# Patient Record
Sex: MALE | Race: WHITE | NOT HISPANIC OR LATINO | Employment: OTHER | URBAN - METROPOLITAN AREA
[De-identification: names, ages, dates, MRNs, and addresses within clinical notes are randomized per-mention and may not be internally consistent; named-entity substitution may affect disease eponyms.]

---

## 2018-04-12 RX ORDER — SILDENAFIL 50 MG/1
TABLET, FILM COATED ORAL
COMMUNITY
End: 2018-04-12 | Stop reason: SDUPTHER

## 2018-04-12 RX ORDER — SILDENAFIL 50 MG/1
50 TABLET, FILM COATED ORAL DAILY PRN
Qty: 20 TABLET | Refills: 0 | Status: SHIPPED | OUTPATIENT
Start: 2018-04-12 | End: 2019-04-26

## 2018-04-12 NOTE — TELEPHONE ENCOUNTER
----- Message from Muriel Mendoza sent at 4/12/2018 11:36 AM CDT -----  Contact: Patient  Patient is requesting a refill on Viagra.  Call Back#[phone]  Thanks     Pharm[facility]:  [facility]

## 2018-04-13 RX ORDER — TRAZODONE HYDROCHLORIDE 100 MG/1
TABLET ORAL
Qty: 180 TABLET | Refills: 3 | Status: SHIPPED | OUTPATIENT
Start: 2018-04-13 | End: 2018-09-13 | Stop reason: SDUPTHER

## 2018-09-10 RX ORDER — TRAZODONE HYDROCHLORIDE 300 MG/1
TABLET ORAL
Qty: 30 TABLET | Refills: 0 | OUTPATIENT
Start: 2018-09-10

## 2018-09-13 RX ORDER — TRAZODONE HYDROCHLORIDE 100 MG/1
200 TABLET ORAL DAILY
Qty: 180 TABLET | Refills: 0 | Status: SHIPPED | OUTPATIENT
Start: 2018-09-13 | End: 2018-12-19 | Stop reason: SDUPTHER

## 2018-09-13 NOTE — TELEPHONE ENCOUNTER
Notified pt of refill.Refill request(s).  Please advise.  Thank you.       ----- Message from Jevon Montes sent at 9/13/2018  3:38 PM CDT -----  Type:  RX Refill Request    Who Called:  Patient  Refill or New Rx:  Refill  RX Name and Strength:  Trazodone  Preferred Pharmacy with phone number:   [facility] [address] [place] [address] AT [facility] [address] [address]  Phone: [phone] Fax: [phone]  Local or Mail Order:  Local  Ordering Provider:  Same  Best Call Back Number:  [phone]

## 2018-10-10 ENCOUNTER — OFFICE VISIT (OUTPATIENT)
Dept: FAMILY MEDICINE | Facility: CLINIC | Age: 68
End: 2018-10-10
Payer: MEDICARE

## 2018-10-10 VITALS
OXYGEN SATURATION: 96 % | DIASTOLIC BLOOD PRESSURE: 75 MMHG | TEMPERATURE: 98 F | BODY MASS INDEX: 31.83 KG/M2 | HEART RATE: 69 BPM | SYSTOLIC BLOOD PRESSURE: 110 MMHG | WEIGHT: 210 LBS | HEIGHT: 68 IN

## 2018-10-10 DIAGNOSIS — T14.8XXA HEMATOMA AND CONTUSION: ICD-10-CM

## 2018-10-10 DIAGNOSIS — B00.9 HERPES SIMPLEX: Primary | ICD-10-CM

## 2018-10-10 PROCEDURE — 99213 OFFICE O/P EST LOW 20 MIN: CPT | Mod: S$GLB,,, | Performed by: NURSE PRACTITIONER

## 2018-10-10 PROCEDURE — 1100F PTFALLS ASSESS-DOCD GE2>/YR: CPT | Mod: CPTII,S$GLB,, | Performed by: NURSE PRACTITIONER

## 2018-10-10 RX ORDER — SULFASALAZINE 500 MG/1
TABLET ORAL
COMMUNITY
End: 2019-06-18 | Stop reason: SDUPTHER

## 2018-10-10 RX ORDER — VALACYCLOVIR HYDROCHLORIDE 500 MG/1
500 TABLET, FILM COATED ORAL 2 TIMES DAILY
Qty: 60 TABLET | Refills: 0 | Status: SHIPPED | OUTPATIENT
Start: 2018-10-10 | End: 2019-04-26

## 2018-10-10 RX ORDER — DOXYCYCLINE HYCLATE 100 MG
TABLET ORAL
Refills: 0 | COMMUNITY
Start: 2018-09-02 | End: 2019-04-26

## 2018-10-10 RX ORDER — BUPROPION HYDROCHLORIDE 300 MG/1
TABLET ORAL
COMMUNITY
End: 2020-01-13 | Stop reason: CLARIF

## 2018-10-10 RX ORDER — BUSPIRONE HYDROCHLORIDE 10 MG/1
TABLET ORAL
Refills: 1 | COMMUNITY
Start: 2018-08-10 | End: 2018-11-19 | Stop reason: SDUPTHER

## 2018-10-10 RX ORDER — OLANZAPINE 5 MG/1
TABLET ORAL
COMMUNITY
End: 2019-04-26 | Stop reason: ALTCHOICE

## 2018-10-10 RX ORDER — PANTOPRAZOLE SODIUM 40 MG/1
TABLET, DELAYED RELEASE ORAL
COMMUNITY
End: 2019-02-06 | Stop reason: SDUPTHER

## 2018-10-10 RX ORDER — DOXYCYCLINE HYCLATE 100 MG
TABLET ORAL
Qty: 30 TABLET | Refills: 0 | OUTPATIENT
Start: 2018-10-10

## 2018-10-10 RX ORDER — AMLODIPINE BESYLATE 5 MG/1
TABLET ORAL
COMMUNITY
End: 2018-12-19 | Stop reason: SDUPTHER

## 2018-10-10 RX ORDER — CITALOPRAM 20 MG/1
TABLET, FILM COATED ORAL
COMMUNITY
Start: 2018-03-26 | End: 2020-01-13 | Stop reason: CLARIF

## 2018-10-10 RX ORDER — LISINOPRIL 20 MG/1
TABLET ORAL
COMMUNITY
End: 2023-07-12

## 2018-10-10 RX ORDER — TAMSULOSIN HYDROCHLORIDE 0.4 MG/1
CAPSULE ORAL
COMMUNITY
End: 2019-05-09 | Stop reason: SDUPTHER

## 2018-10-10 RX ORDER — CARVEDILOL PHOSPHATE 20 MG/1
20 CAPSULE, EXTENDED RELEASE ORAL DAILY
COMMUNITY
End: 2020-01-13 | Stop reason: CLARIF

## 2018-10-10 RX ORDER — ASPIRIN 81 MG/1
81 TABLET ORAL DAILY
COMMUNITY

## 2018-10-10 NOTE — PROGRESS NOTES
Chief Complaint  Chief Complaint   Patient presents with    Fall     pt states he fell and bruised his right arm       HPI  Betzy Ortega is a 68 y.o. male with medical diagnoses as listed within the medical history and problem list that presents for fall approximately 12 days prior to visit today. He was stepping off into a boat and slipped. He notes bruising to RUE above elbow. He had abrasions which are healing well. He is concerned with a localized palpable area within the contusion. He has pain to the ipsilateral elbow and wrist. He is also requesting refill of Valtrex for recurrent herpes simplex virus. He usually has some on hand for outbreak. He only needs a refill, no outbreak at this time..     PAST MEDICAL HISTORY:  History reviewed. No pertinent past medical history.    PAST SURGICAL HISTORY:  History reviewed. No pertinent surgical history.    SOCIAL HISTORY:  Social History     Socioeconomic History    Marital status:      Spouse name: Not on file    Number of children: Not on file    Years of education: Not on file    Highest education level: Not on file   Social Needs    Financial resource strain: Not on file    Food insecurity - worry: Not on file    Food insecurity - inability: Not on file    Transportation needs - medical: Not on file    Transportation needs - non-medical: Not on file   Occupational History    Not on file   Tobacco Use    Smoking status: Never Smoker   Substance and Sexual Activity    Alcohol use: No     Frequency: Never    Drug use: No    Sexual activity: Not on file   Other Topics Concern    Not on file   Social History Narrative    Not on file       FAMILY HISTORY:  History reviewed. No pertinent family history.    ALLERGIES AND MEDICATIONS: updated and reviewed.  Review of patient's allergies indicates:   Allergen Reactions    Abilify [aripiprazole]     Dapsone     Hydrochlorothiazide     Losartan      Current Outpatient Medications   Medication  Sig Dispense Refill    amLODIPine (NORVASC) 5 MG tablet amlodipine 5 mg tablet   Take 1 tablet every day by oral route for 90 days.      aspirin (ECOTRIN) 81 MG EC tablet Take 81 mg by mouth once daily.      buPROPion (WELLBUTRIN XL) 300 MG 24 hr tablet bupropion hcl xl 300 mg tb24      busPIRone (BUSPAR) 10 MG tablet TK 1 T PO TID PRN  1    CARVEDILOL PHOSPHATE 20 MG ORAL CM24 (COREG CR) 20 mg 24 hr capsule Take 20 mg by mouth once daily.      citalopram (CELEXA) 20 MG tablet citalopram 20 mg tablet      doxycycline (VIBRA-TABS) 100 MG tablet TK 1 T PO QD  0    lisinopril (PRINIVIL,ZESTRIL) 20 MG tablet lisinopril 20 mg tablet   TAKE 1 TABLET EVERY DAY      OLANZapine (ZYPREXA) 5 MG tablet olanzapine 5 mg tablet   TAKE 1 TABLET EVERY DAY      pantoprazole (PROTONIX) 40 MG tablet pantoprazole 40 mg tablet,delayed release   TAKE 1 TABLET EVERY DAY      sildenafil (VIAGRA) 50 MG tablet Take 1 tablet (50 mg total) by mouth daily as needed for Erectile Dysfunction. 20 tablet 0    sulfaSALAzine (AZULFIDINE) 500 mg Tab sulfasalazine 500 mg tablet   TAKE 1 TABLET BY MOUTH 3 TIMES A DAY AS NEEDED      tamsulosin (FLOMAX) 0.4 mg Cap tamsulosin 0.4 mg capsule   TAKE 1 CAPSULE BY MOUTH EVERYDAY      traZODone (DESYREL) 100 MG tablet Take 2 tablets (200 mg total) by mouth once daily. 180 tablet 0    valACYclovir (VALTREX) 500 MG tablet Take 1 tablet (500 mg total) by mouth 2 (two) times daily. Take for 3 day with outbreak. 60 tablet 0     No current facility-administered medications for this visit.          ROS  Review of Systems   Constitutional: Negative for fatigue.   Eyes: Negative for visual disturbance.   Respiratory: Negative for shortness of breath.    Cardiovascular: Negative for chest pain and palpitations.   Gastrointestinal: Negative for abdominal pain.   Genitourinary: Negative for difficulty urinating.   Musculoskeletal: Positive for arthralgias (RUE, see HPI.).   Skin: Negative for pallor and rash.  "  Neurological: Negative for dizziness, light-headedness and headaches.   Hematological: Bruises/bleeds easily.           PHYSICAL EXAM  Vitals:    10/10/18 1545   BP: 110/75   BP Location: Left arm   Patient Position: Sitting   BP Method: Medium (Automatic)   Pulse: 69   Temp: 97.8 °F (36.6 °C)   TempSrc: Tympanic   SpO2: 96%   Weight: 95.3 kg (210 lb)   Height: 5' 8" (1.727 m)    Body mass index is 31.93 kg/m².  Weight: 95.3 kg (210 lb)   Height: 5' 8" (172.7 cm)       Physical Exam   Constitutional: He is oriented to person, place, and time. He appears well-developed and well-nourished.   HENT:   Head: Normocephalic and atraumatic.   Eyes: EOM are normal. Pupils are equal, round, and reactive to light.   Neck: Normal range of motion. Neck supple.   Cardiovascular: Normal rate and regular rhythm.   Pulmonary/Chest: Effort normal and breath sounds normal.   Abdominal: Soft. Bowel sounds are normal.   Musculoskeletal: Normal range of motion.   Neurological: He is alert and oriented to person, place, and time.   Skin: Bruising and ecchymosis noted.        Psychiatric: He has a normal mood and affect. His behavior is normal. Judgment and thought content normal.   Vitals reviewed.        Health Maintenance       Date Due Completion Date    Hepatitis C Screening 1950 ---    Lipid Panel 1950 ---    TETANUS VACCINE 07/24/1967 ---    Colonoscopy 07/24/1999 ---    Zoster Vaccine 07/24/2009 ---    Pneumococcal (65+) (1 of 2 - PCV13) 07/24/2014 ---    Influenza Vaccine 08/01/2018 10/16/2014               Assessment & Plan    Betzy was seen today for fall.    Diagnoses and all orders for this visit:    Herpes simplex  -     valACYclovir (VALTREX) 500 MG tablet; Take 1 tablet (500 mg total) by mouth 2 (two) times daily. Take for 3 day with outbreak.    Hematoma and contusion  Made appointment with Dr. Truong for surgical consult. I recommended ultrasound imaging of the area. Patient would like surgical opinion " first. For now, Tylenol PRN for pain and warm compresses to the area. Patient voiced understanding.       Follow-up: No Follow-up on file.

## 2018-10-11 ENCOUNTER — OFFICE VISIT (OUTPATIENT)
Dept: SURGERY | Facility: CLINIC | Age: 68
End: 2018-10-11
Payer: MEDICARE

## 2018-10-11 VITALS
DIASTOLIC BLOOD PRESSURE: 82 MMHG | RESPIRATION RATE: 18 BRPM | BODY MASS INDEX: 31.52 KG/M2 | SYSTOLIC BLOOD PRESSURE: 119 MMHG | TEMPERATURE: 96 F | WEIGHT: 208 LBS | HEIGHT: 68 IN | HEART RATE: 65 BPM | OXYGEN SATURATION: 96 %

## 2018-10-11 DIAGNOSIS — S40.021A TRAUMATIC HEMATOMA OF RIGHT UPPER ARM, INITIAL ENCOUNTER: Primary | ICD-10-CM

## 2018-10-11 DIAGNOSIS — S54.01XA CONTUSION OF RIGHT ULNAR NERVE, INITIAL ENCOUNTER: ICD-10-CM

## 2018-10-11 PROCEDURE — 1100F PTFALLS ASSESS-DOCD GE2>/YR: CPT | Mod: CPTII,S$GLB,, | Performed by: SURGERY

## 2018-10-11 PROCEDURE — 99203 OFFICE O/P NEW LOW 30 MIN: CPT | Mod: S$GLB,,, | Performed by: SURGERY

## 2018-10-11 RX ORDER — GABAPENTIN 300 MG/1
300 CAPSULE ORAL 2 TIMES DAILY
Qty: 60 CAPSULE | Refills: 0 | Status: SHIPPED | OUTPATIENT
Start: 2018-10-11 | End: 2019-04-26

## 2018-10-11 RX ORDER — ONDANSETRON 4 MG/1
4 TABLET, FILM COATED ORAL EVERY 6 HOURS PRN
Qty: 30 TABLET | Refills: 0 | Status: SHIPPED | OUTPATIENT
Start: 2018-10-11 | End: 2020-01-13 | Stop reason: CLARIF

## 2018-10-11 RX ORDER — OXYCODONE AND ACETAMINOPHEN 10; 325 MG/1; MG/1
1 TABLET ORAL EVERY 6 HOURS PRN
Qty: 40 TABLET | Refills: 0 | Status: SHIPPED | OUTPATIENT
Start: 2018-10-11 | End: 2020-01-13 | Stop reason: CLARIF

## 2018-10-11 NOTE — PROGRESS NOTES
Subjective:       Patient ID: Betzy Ortega is a 68 y.o. male.    Chief Complaint: Consult (Referral-Evi, SOL- Bunny SUMMERS )      HPI:  Mr. Ortega presents today as a consult from Evi HAWKINS.  He fell getting onto a boat about 2 weeks ago.  Pain began in his right elbow medial side and radiates to his right ulnar side of the wrist as well as the right ring and small fingers.  He is also noted numbness in this area. He fell striking the right upper arm on the side of the boat.  No other trauma.  He noted a significant bruise on the extensor surface of his right arm just proximal to the elbow the day following the accident.  He is noted firm tender masses; 1 masses immediately beneath the bruise and the other is in the posterior upper arm at the level of the humerus proximal and middle third junctions.        Allergies & Meds:  Review of patient's allergies indicates:   Allergen Reactions    Abilify [aripiprazole]     Dapsone     Hydrochlorothiazide     Losartan        Current Outpatient Medications   Medication Sig Dispense Refill    amLODIPine (NORVASC) 5 MG tablet amlodipine 5 mg tablet   Take 1 tablet every day by oral route for 90 days.      aspirin (ECOTRIN) 81 MG EC tablet Take 81 mg by mouth once daily.      buPROPion (WELLBUTRIN XL) 300 MG 24 hr tablet bupropion hcl xl 300 mg tb24      busPIRone (BUSPAR) 10 MG tablet TK 1 T PO TID PRN  1    CARVEDILOL PHOSPHATE 20 MG ORAL CM24 (COREG CR) 20 mg 24 hr capsule Take 20 mg by mouth once daily.      citalopram (CELEXA) 20 MG tablet citalopram 20 mg tablet      doxycycline (VIBRA-TABS) 100 MG tablet TK 1 T PO QD  0    lisinopril (PRINIVIL,ZESTRIL) 20 MG tablet lisinopril 20 mg tablet   TAKE 1 TABLET EVERY DAY      OLANZapine (ZYPREXA) 5 MG tablet olanzapine 5 mg tablet   TAKE 1 TABLET EVERY DAY      pantoprazole (PROTONIX) 40 MG tablet pantoprazole 40 mg tablet,delayed release   TAKE 1 TABLET EVERY DAY      sildenafil (VIAGRA) 50 MG  tablet Take 1 tablet (50 mg total) by mouth daily as needed for Erectile Dysfunction. 20 tablet 0    sulfaSALAzine (AZULFIDINE) 500 mg Tab sulfasalazine 500 mg tablet   TAKE 1 TABLET BY MOUTH 3 TIMES A DAY AS NEEDED      tamsulosin (FLOMAX) 0.4 mg Cap tamsulosin 0.4 mg capsule   TAKE 1 CAPSULE BY MOUTH EVERYDAY      traZODone (DESYREL) 100 MG tablet Take 2 tablets (200 mg total) by mouth once daily. 180 tablet 0    valACYclovir (VALTREX) 500 MG tablet Take 1 tablet (500 mg total) by mouth 2 (two) times daily. Take for 3 day with outbreak. 60 tablet 0    gabapentin (NEURONTIN) 300 MG capsule Take 1 capsule (300 mg total) by mouth 2 (two) times daily. 60 capsule 0    ondansetron (ZOFRAN) 4 MG tablet Take 1 tablet (4 mg total) by mouth every 6 (six) hours as needed for Nausea. 30 tablet 0    oxyCODONE-acetaminophen (PERCOCET)  mg per tablet Take 1 tablet by mouth every 6 (six) hours as needed for Pain. 40 tablet 0     No current facility-administered medications for this visit.        PMFSHx:  Past Medical History:   Diagnosis Date    Anxiety     Cataract     Depression     Disorder of kidney and ureter     Hypertension     Lupus     Stroke 04/2017       Past Surgical History:   Procedure Laterality Date    carpel tunnel hand Bilateral     cataract surgery Left     cervical fusion cadivor bone      KNEE SURGERY Left     OSTEOMYLITIS       Family History   Problem Relation Age of Onset    Cancer Mother     Prostate cancer Father     Pancreatic cancer Brother        Social History     Tobacco Use    Smoking status: Never Smoker   Substance Use Topics    Alcohol use: No     Frequency: Never    Drug use: No       Review of Systems   Constitutional: Negative for appetite change, chills, fatigue, fever and unexpected weight change.   HENT: Negative for congestion, dental problem, ear pain, mouth sores, postnasal drip, rhinorrhea, sore throat, tinnitus, trouble swallowing and voice change.     Eyes: Negative for photophobia, pain, discharge and visual disturbance.   Respiratory: Negative for cough, chest tightness, shortness of breath and wheezing.    Cardiovascular: Negative for chest pain, palpitations and leg swelling.   Gastrointestinal: Negative for abdominal pain, blood in stool, constipation, diarrhea, nausea and vomiting.   Endocrine: Negative for cold intolerance, heat intolerance, polydipsia, polyphagia and polyuria.   Genitourinary: Negative for difficulty urinating, dysuria, flank pain, frequency, hematuria and urgency.   Musculoskeletal: Negative for arthralgias, joint swelling and neck pain.   Skin: Negative for color change and rash.   Allergic/Immunologic: Negative for immunocompromised state.   Neurological: Negative for dizziness, tremors, seizures, syncope, speech difficulty, weakness, numbness and headaches.   Hematological: Negative for adenopathy. Does not bruise/bleed easily.   Psychiatric/Behavioral: Negative for agitation, confusion, hallucinations, self-injury and suicidal ideas. The patient is not nervous/anxious.        Objective:      Physical Exam   Constitutional: He appears well-developed and well-nourished.  Non-toxic appearance. He does not appear ill. No distress.   Cardiovascular: Normal rate, regular rhythm and normal heart sounds. PMI is not displaced. Exam reveals no gallop.   No murmur heard.  Pulmonary/Chest: Effort normal and breath sounds normal. No accessory muscle usage. No tachypnea. No respiratory distress.   Abdominal: Soft. Normal appearance and bowel sounds are normal. There is no tenderness. No hernia.   Musculoskeletal:        Right upper arm: He exhibits tenderness and swelling.        Right forearm: He exhibits tenderness and swelling.   Hematoma just proximal to the medial condyle of the right elbow measures 4 x 4 cm with overlying ecchymosis.  Hematoma measuring 5 x 5 cm at the junction of the proximal and middle thirds of the right humerus  extensor surface. Full range of motion right hand, wrist, elbow, shoulder.   Skin: Skin is warm, dry and intact. No rash noted.           Assessment:       1. Traumatic hematoma of right upper arm, initial encounter    2. Contusion of right ulnar nerve, initial encounter        Plan:   Traumatic hematoma of right upper arm, initial encounter    Contusion of right ulnar nerve, initial encounter    Other orders  -     oxyCODONE-acetaminophen (PERCOCET)  mg per tablet; Take 1 tablet by mouth every 6 (six) hours as needed for Pain.  Dispense: 40 tablet; Refill: 0  -     ondansetron (ZOFRAN) 4 MG tablet; Take 1 tablet (4 mg total) by mouth every 6 (six) hours as needed for Nausea.  Dispense: 30 tablet; Refill: 0  -     gabapentin (NEURONTIN) 300 MG capsule; Take 1 capsule (300 mg total) by mouth 2 (two) times daily.  Dispense: 60 capsule; Refill: 0        Follow-up in about 2 weeks (around 10/25/2018).

## 2018-11-13 RX ORDER — DOXYCYCLINE HYCLATE 100 MG
TABLET ORAL
Qty: 30 TABLET | Refills: 0 | OUTPATIENT
Start: 2018-11-13

## 2018-11-13 RX ORDER — BUSPIRONE HYDROCHLORIDE 10 MG/1
TABLET ORAL
Qty: 90 TABLET | Refills: 0 | OUTPATIENT
Start: 2018-11-13

## 2018-11-19 NOTE — TELEPHONE ENCOUNTER
Pt notified      Pt request refill.  Please advise.  Thank you,  Cynthia        ----- Message from Raimundo Schmitt sent at 11/19/2018  2:42 PM CST -----  Contact: Patient  Type:  RX Refill Request    Who Called:  Patient  Refill or New Rx:  Refill  RX Name and Strength:  busPIRone (BUSPAR) 10 MG tablet  How is the patient currently taking it? (ex. 1XDay):  Doctor Recommended  Is this a 30 day or 90 day RX:  30  Preferred Pharmacy with phone number:    [facility] [id number] [place] [address] - [address] [place] [address] AT [facility] [address]  [address] [facility] [address]  [facility] [address]  Phone: [phone] Fax: [phone]  Local or Mail Order:  Local  Ordering Provider:  Evi Regalado Call Back Number:  [phone]  Additional Information:  NA

## 2018-11-20 ENCOUNTER — OFFICE VISIT (OUTPATIENT)
Dept: FAMILY MEDICINE | Facility: CLINIC | Age: 68
End: 2018-11-20
Payer: MEDICARE

## 2018-11-20 VITALS
HEIGHT: 68 IN | WEIGHT: 210.19 LBS | HEART RATE: 68 BPM | BODY MASS INDEX: 31.86 KG/M2 | SYSTOLIC BLOOD PRESSURE: 123 MMHG | DIASTOLIC BLOOD PRESSURE: 77 MMHG | OXYGEN SATURATION: 98 % | TEMPERATURE: 99 F

## 2018-11-20 DIAGNOSIS — J00 HEAD COLD: Primary | ICD-10-CM

## 2018-11-20 PROCEDURE — 99213 OFFICE O/P EST LOW 20 MIN: CPT | Mod: S$GLB,,, | Performed by: NURSE PRACTITIONER

## 2018-11-20 PROCEDURE — 1100F PTFALLS ASSESS-DOCD GE2>/YR: CPT | Mod: CPTII,S$GLB,, | Performed by: NURSE PRACTITIONER

## 2018-11-20 RX ORDER — PSEUDOEPHEDRINE HCL 30 MG
30 TABLET ORAL EVERY 4 HOURS PRN
Qty: 30 TABLET | Refills: 0 | Status: SHIPPED | OUTPATIENT
Start: 2018-11-20 | End: 2018-11-30

## 2018-11-20 RX ORDER — BUSPIRONE HYDROCHLORIDE 10 MG/1
TABLET ORAL
Qty: 90 TABLET | Refills: 0 | Status: SHIPPED | OUTPATIENT
Start: 2018-11-20 | End: 2019-02-06

## 2018-11-20 NOTE — PROGRESS NOTES
Subjective:       Patient ID: Betzy Ortega is a 68 y.o. male.    Chief Complaint: Sinusitis (pt c/o itchy, watery eyes, sneezing no fever)    69 y/o WM presents with c/o 3 week h/o runny nose and post nasal drip. He denies fever, chills, HA or ear pressure/pain. He has been taking OCT Sudafed. He denies productive yellow/green phlegm or sputum. His BP is well controlled despite taking OTC medications.       Sinus Problem   This is a new problem. The current episode started 1 to 4 weeks ago. The problem is unchanged. There has been no fever. His pain is at a severity of 0/10. He is experiencing no pain. Associated symptoms include sneezing. Pertinent negatives include no chills, coughing, diaphoresis, ear pain, headaches, shortness of breath, sinus pressure or sore throat. Treatments tried: OTC Sudafed. The treatment provided mild relief.     Review of Systems   Constitutional: Negative for chills, diaphoresis, fever and unexpected weight change.   HENT: Positive for postnasal drip, rhinorrhea and sneezing. Negative for dental problem, ear pain, sinus pressure, sore throat and trouble swallowing.    Eyes: Negative for visual disturbance.   Respiratory: Negative for cough, shortness of breath and wheezing.    Cardiovascular: Negative for chest pain and palpitations.   Gastrointestinal: Negative for abdominal pain, constipation, diarrhea, nausea and vomiting.   Endocrine: Negative for polydipsia and polyuria.   Genitourinary: Negative for dysuria.   Musculoskeletal: Negative for joint swelling.   Skin: Negative for rash.   Neurological: Negative for syncope and headaches.   Psychiatric/Behavioral: Negative for agitation and confusion.       Objective:      Physical Exam   Constitutional: He is oriented to person, place, and time. He appears well-developed and well-nourished.   HENT:   Head: Normocephalic.   Eyes: Pupils are equal, round, and reactive to light.   Neck: Normal range of motion. Neck supple.    Cardiovascular: Normal rate, regular rhythm and normal heart sounds.   Pulmonary/Chest: Effort normal and breath sounds normal.   Abdominal: Soft. Bowel sounds are normal.   Musculoskeletal: Normal range of motion.   Neurological: He is alert and oriented to person, place, and time.   Skin: Skin is warm and dry. Capillary refill takes less than 2 seconds.   Psychiatric: He has a normal mood and affect. Judgment and thought content normal.   Vitals reviewed.      Assessment:       1. Head cold        Plan:       1- if symptoms worsen with productive yellow/green phlegm or sputum pt to call the clinic for abx  2- take prescription sudafed as prescribed

## 2018-11-20 NOTE — PATIENT INSTRUCTIONS
Understanding the Cold Virus  Colds are the most common illness that people get. Most adults get 2 or 3 colds per year, and most children get 5 to 7 colds per year. Colds may be caused by over 200 types of viruses. The most common of these are rhinoviruses (rhino refers to the nose).  What causes a cold virus?  All colds start with infection by a virus. You can be infected by more than one cold virus at a time. Infection with cold viruses happens when:  · You breathe in a virus from the air. This can happen when someone with a cold sneezes or coughs near you.  · You touch your eyes, nose, or mouth when your hand has a cold virus on it. This can happen if you touch an object that has the cold virus on it.  What are the symptoms of a cold virus?  Almost all colds involve a stuffy nose. Other common symptoms include:  · Runny nose  · Sneezing  · Sore throat  · Headache  · Cough  How is a cold treated?  Colds usually last 5 to 10 days. Treatment focuses on relieving symptoms. Treatments may include:  · Decongestant medicines. Several types of decongestants are available without prescription. These may help reduce stuffy or runny nose symptoms.  · Prescription or over-the-counter nasal sprays. These may help reduce nasal symptoms, including stuffiness.  · Prescription or over-the-counter pain medicines. These can help with headaches and sore throat.  · Self-care. This includes extra rest, using humidifiers, and drinking more fluids. These help you feel better while you are getting over a cold.  Antibiotics are not helpful for a cold. They do not make a cold shorter or relieve symptoms. Taking antibiotics when you dont need them can make them work less well when you need them for another illness.  Follow all directions for using medicines, especially when giving them to children. Contact your healthcare provider if you have any questions about using cold medicines safely.  Can a cold be prevented?  You can help  reduce the spread of cold viruses. This can help both you and others avoid getting colds. Follow these tips:  · Wash your hands well anytime you may have come into contact with cold viruses. Wash your hands for at least 20 seconds. When you cant wash with soap and water, use an alcohol-based hand .  · Dont touch your nose, eyes, or mouth, especially after touching something that may have a cold virus on it.  · Cover your mouth and nose when you cough or sneeze. Throw away tissues after using them.  · Disinfect things you touch often, such as phones and keyboards.    · Stay home when you have a cold.  What are the possible complications of a cold virus?  Colds usually go away by themselves. But its not unusual to get another type of infection while you have a cold. These can include:  · Sinus infection  · Lung infection, such as bronchitis or pneumonia  · Ear infection  If you have asthma or chronic bronchitis, a cold can make your condition worse.     When should I call my healthcare provider?  Call your healthcare provider right away if you have any of these:  · Fever of 100.4°F (38°C) or higher, or as directed  · Cough, chest pain, or shortness of breath that gets worse  · Symptoms dont get better or get worse after about 10 days  · Headache, sleepiness, or confusion that gets worse   Date Last Reviewed: 3/28/2016  © 7804-0835 [employer]. [address] [place] [address], [place] [address]. All rights reserved. This information is not intended as a substitute for professional medical care. Always follow your healthcare professional's instructions.

## 2018-11-21 ENCOUNTER — TELEPHONE (OUTPATIENT)
Dept: FAMILY MEDICINE | Facility: CLINIC | Age: 68
End: 2018-11-21

## 2018-11-21 RX ORDER — AZITHROMYCIN 250 MG/1
TABLET, FILM COATED ORAL
Qty: 6 TABLET | Refills: 0 | Status: SHIPPED | OUTPATIENT
Start: 2018-11-21 | End: 2018-11-26

## 2018-11-21 NOTE — TELEPHONE ENCOUNTER
+Spoke with pt and he states that he already spoke with provider regarding request for ABT.      ----- Message from Dodie Roberts sent at 11/21/2018  9:51 AM CST -----  Contact: self  Type: Needs Medical Advice    Who Called:  self  Symptoms (please be specific):  Head cold, nose bleeding  How long has patient had these symptoms:    Pharmacy name and phone #:  [facility]  Best Call Back Number: [phone]  Additional Information: Patient states he feels he does need the antibiotic. Please call patient. Thanks!    [facility] [address] [place], [place] - [address] [place] [address] AT [address]  [address]  [facility] [address]  Phone: [phone] Fax: [phone]

## 2018-11-21 NOTE — TELEPHONE ENCOUNTER
Spoke with Marina @[facility], rx called in, pt notified.        ----- Message from Hua Pandey sent at 11/21/2018  4:13 PM CST -----  Type: Needs Medical Advice    Who Called:  Jailene/[facility]    Pharmacy name and phone #:    [facility] [address] [place], [place] - [address] [place] [address] AT [address]  [address] [address]  Phone: [phone] Fax: [phone]  Best Call Back Number: [phone]  Additional Information: Caller states that the patient is requesting his prescription for azithromycin (Z-RADHA) 250 MG tablet which didn't arrive at the pharmacy.  Please call to advise

## 2018-11-21 NOTE — TELEPHONE ENCOUNTER
Please advise.  Thank you,  Cynthia      ----- Message from Stella Grant sent at 11/21/2018 12:28 PM CST -----  Type: Needs Medical Advice    Who Called:Patient    Pharmacy name and phone #:    [id number] - [place] - [address] [facility] [address] AT [address]  [address]  [facility] [address]  Phone: [phone] Fax: [phone]  Best Call Back Number: [phone] (home)     Additional Information: Patient currently at the pharmacy, stating antibiotic was denied, please advise

## 2018-12-19 RX ORDER — AMLODIPINE BESYLATE 5 MG/1
5 TABLET ORAL DAILY
Qty: 90 TABLET | Refills: 1 | Status: SHIPPED | OUTPATIENT
Start: 2018-12-19 | End: 2019-10-13 | Stop reason: SDUPTHER

## 2018-12-19 RX ORDER — TRAZODONE HYDROCHLORIDE 100 MG/1
200 TABLET ORAL DAILY
Qty: 180 TABLET | Refills: 0 | Status: SHIPPED | OUTPATIENT
Start: 2018-12-19 | End: 2019-03-26 | Stop reason: SDUPTHER

## 2018-12-19 RX ORDER — AMLODIPINE BESYLATE 5 MG/1
TABLET ORAL
Qty: 90 TABLET | Refills: 1 | Status: SHIPPED | OUTPATIENT
Start: 2018-12-19 | End: 2018-12-19 | Stop reason: SDUPTHER

## 2018-12-19 NOTE — TELEPHONE ENCOUNTER
Pt request refill.  Please advise.  Thank you,  Cynthia      ----- Message from Miky Ramos sent at 12/19/2018  8:25 AM CST -----  Contact: patient  Type:  RX Refill Request    Who Called:  Patient  Refill or New Rx:  refill  RX Name and Strength:  traZODone (DESYREL) 100 MG tablet and amLODIPine (NORVASC) 5 MG tablet  How is the patient currently taking it? (ex. 1XDay):    Is this a 30 day or 90 day RX:    Preferred Pharmacy with phone number:  [facility]  Local or Mail Order:  local  Ordering Provider:    Best Call Back Number:  [phone]  Additional Information:  Requesting a call back when script has been sent

## 2019-01-20 RX ORDER — BUSPIRONE HYDROCHLORIDE 10 MG/1
TABLET ORAL
Qty: 90 TABLET | Refills: 0 | Status: CANCELLED | OUTPATIENT
Start: 2019-01-20

## 2019-01-28 RX ORDER — BUSPIRONE HYDROCHLORIDE 10 MG/1
TABLET ORAL
Qty: 90 TABLET | Refills: 0 | Status: CANCELLED | OUTPATIENT
Start: 2019-01-28

## 2019-01-29 RX ORDER — PANTOPRAZOLE SODIUM 40 MG/1
TABLET, DELAYED RELEASE ORAL
Qty: 30 TABLET | Refills: 0 | OUTPATIENT
Start: 2019-01-29

## 2019-02-06 RX ORDER — BUSPIRONE HYDROCHLORIDE 15 MG/1
15 TABLET ORAL 2 TIMES DAILY
Qty: 180 TABLET | Refills: 1 | Status: SHIPPED | OUTPATIENT
Start: 2019-02-06 | End: 2019-09-16 | Stop reason: SDUPTHER

## 2019-02-06 RX ORDER — BUSPIRONE HYDROCHLORIDE 10 MG/1
10 TABLET ORAL 3 TIMES DAILY PRN
Qty: 90 TABLET | Refills: 0 | Status: CANCELLED | OUTPATIENT
Start: 2019-02-06

## 2019-02-06 RX ORDER — PANTOPRAZOLE SODIUM 40 MG/1
40 TABLET, DELAYED RELEASE ORAL DAILY
Qty: 90 TABLET | Refills: 1 | Status: SHIPPED | OUTPATIENT
Start: 2019-02-06 | End: 2019-02-07 | Stop reason: SDUPTHER

## 2019-02-06 NOTE — TELEPHONE ENCOUNTER
Duplicate        ----- Message from Corbin Taragno sent at 2/6/2019 10:58 AM CST -----  Contact: Patient  Type:  RX Refill Request    Who Called:  Patient  Refill or New Rx:  Refill  RX Name and Strength:  pantoprazole (PROTONIX) 40 MG tablet  How is the patient currently taking it? (ex. 1XDay):  x1 per day  Is this a 30 day or 90 day RX:  30  Preferred Pharmacy with phone number:    [facility] [id number] [address] AT [facility] [address]  [address] [place] [address]  Phone: [phone] Fax: [phone]  Local or Mail Order:  Local  Ordering Provider:  Rosio Regalado Call Back Number:  [phone]  Please notify patient when complete

## 2019-02-06 NOTE — TELEPHONE ENCOUNTER
Please notify pt medications refilled for 6 months.  Also, I changed his buspar from 10mg TID to 15mg bid. This is the same dose but cuts the frequency back to twice daily. If pt is not okay with the change, I can change it back. ty

## 2019-02-06 NOTE — TELEPHONE ENCOUNTER
Pt request refills.  Pt last office visit 11/20.  Please advise.  Thank you,  Cynthia        ----- Message from Stella Grant sent at 2/6/2019 10:55 AM [facility] -----  Type:  RX Refill Request    Who Called:  Patient  Refill or New Rx:  refill  RX Name and Strength:    busPIRone (BUSPAR) 10 MG tablet  pantoprazole (PROTONIX) 40 MG tablet  How is the patient currently taking it? (ex. 1XDay):  Na  Is this a 30 day or 90 day RX:  90  Preferred Pharmacy with phone number:    [facility] [id number] [place] [address] - [address] [place] [address] AT [address]  [address] [place] [address]  Phone: [phone] Fax: [phone]  Local or Mail Order: local  Ordering Provider:  Rosio Regalado Call Back Number:  [phone] (home)     Additional Information:  Patrizia

## 2019-02-07 DIAGNOSIS — Z76.0 MEDICATION REFILL: Primary | ICD-10-CM

## 2019-02-07 RX ORDER — PANTOPRAZOLE SODIUM 40 MG/1
40 TABLET, DELAYED RELEASE ORAL DAILY
Qty: 90 TABLET | Refills: 1 | Status: SHIPPED | OUTPATIENT
Start: 2019-02-07 | End: 2020-01-13 | Stop reason: CLARIF

## 2019-03-21 ENCOUNTER — TELEPHONE (OUTPATIENT)
Dept: FAMILY MEDICINE | Facility: CLINIC | Age: 69
End: 2019-03-21

## 2019-03-21 NOTE — TELEPHONE ENCOUNTER
Pt offered an appointment but declined stating he will stop taking the requested medication.        2nd attempt to call pt, no answer, will try once more to notify pt before mailing a letter.        ----- Message from Shyla Peterson sent at 3/21/2019 10:00 AM CDT -----  Contact: patient  Type:  RX Refill Request    Who Called:  patient  Refill or New Rx:  Refill  RX Name and Strength:  Trazodone 100 mg  How is the patient currently taking it? (ex. 1XDay):    Is this a 30 day or 90 day RX:    Preferred Pharmacy with phone number:  [facility]  Local or Mail Order: Local  Ordering Provider:  Mohit Regalado Call Back Number: [phone] (home)   Additional Information:  Pt states he only have one pill left please send to pharmacy today

## 2019-03-21 NOTE — TELEPHONE ENCOUNTER
Attempted to return call to pt, no answer, left message to schedule an appointment..          ----- Message from Shyla Peterson sent at 3/21/2019 10:00 AM CDT -----  Contact: patient  Type:  RX Refill Request    Who Called:  patient  Refill or New Rx:  Refill  RX Name and Strength:  Trazodone 100 mg  How is the patient currently taking it? (ex. 1XDay):    Is this a 30 day or 90 day RX:    Preferred Pharmacy with phone number:  [facility]  Local or Mail Order: Local  Ordering Provider:  Mohit Regalado Call Back Number: [phone] (home)   Additional Information:  Pt states he only have one pill left please send to pharmacy today

## 2019-03-26 RX ORDER — TRAZODONE HYDROCHLORIDE 100 MG/1
200 TABLET ORAL DAILY
Qty: 180 TABLET | Refills: 3 | Status: SHIPPED | OUTPATIENT
Start: 2019-03-26 | End: 2020-03-12

## 2019-03-26 NOTE — TELEPHONE ENCOUNTER
"Pt request refill.  Pt scheduled to see NP 04/12 but is out of medication.  Pt last seen 10/10/2018 and told he must be seen every 3 months cc medication.  Pt called 03/21 for refill but declined offer for an appointment stating "I will stop taking the medication".  Please advise.    Thank you,  Cynthia     ----- Message from Hua Pandey sent at 3/26/2019  8:47 AM CDT -----  Type:  RX Refill Request    Who Called:  Patrient  Refill or New Rx: Refill  RX Name and Strength: traZODone (DESYREL) 100 MG tablet      How is the patient currently taking it? (ex. 1XDay): 2XDay  Is this a 30 day or 90 day RX:  90  Preferred Pharmacy with phone number:   [facility] [address] [address] [address] [facility] [address]  [address] [place] [address]  Phone: [phone] Fax: [phone]    Local or Mail Order:  Local  Ordering Provider:  Mohit Regalado Call Back Number:  [phone]  Additional Information:        "

## 2019-04-26 ENCOUNTER — OFFICE VISIT (OUTPATIENT)
Dept: FAMILY MEDICINE | Facility: CLINIC | Age: 69
End: 2019-04-26
Payer: MEDICARE

## 2019-04-26 VITALS
OXYGEN SATURATION: 97 % | HEIGHT: 68 IN | BODY MASS INDEX: 31.67 KG/M2 | HEART RATE: 69 BPM | SYSTOLIC BLOOD PRESSURE: 129 MMHG | WEIGHT: 209 LBS | DIASTOLIC BLOOD PRESSURE: 86 MMHG | RESPIRATION RATE: 20 BRPM

## 2019-04-26 DIAGNOSIS — I10 ESSENTIAL HYPERTENSION: ICD-10-CM

## 2019-04-26 DIAGNOSIS — I25.2 HISTORY OF MI (MYOCARDIAL INFARCTION): ICD-10-CM

## 2019-04-26 DIAGNOSIS — Z12.5 ENCOUNTER FOR SCREENING FOR MALIGNANT NEOPLASM OF PROSTATE: ICD-10-CM

## 2019-04-26 DIAGNOSIS — F33.2 SEVERE EPISODE OF RECURRENT MAJOR DEPRESSIVE DISORDER, WITHOUT PSYCHOTIC FEATURES: ICD-10-CM

## 2019-04-26 DIAGNOSIS — M62.89 PERIPHERAL MUSCLE FATIGUE: Primary | ICD-10-CM

## 2019-04-26 DIAGNOSIS — D53.9 NUTRITIONAL ANEMIA: ICD-10-CM

## 2019-04-26 DIAGNOSIS — Z86.73 HISTORY OF CVA (CEREBROVASCULAR ACCIDENT): ICD-10-CM

## 2019-04-26 DIAGNOSIS — F51.01 PRIMARY INSOMNIA: ICD-10-CM

## 2019-04-26 DIAGNOSIS — R53.83 FATIGUE, UNSPECIFIED TYPE: ICD-10-CM

## 2019-04-26 DIAGNOSIS — R35.1 BENIGN PROSTATIC HYPERPLASIA WITH NOCTURIA: ICD-10-CM

## 2019-04-26 DIAGNOSIS — N40.1 BENIGN PROSTATIC HYPERPLASIA WITH NOCTURIA: ICD-10-CM

## 2019-04-26 DIAGNOSIS — N52.9 ERECTILE DYSFUNCTION, UNSPECIFIED ERECTILE DYSFUNCTION TYPE: ICD-10-CM

## 2019-04-26 PROCEDURE — 99213 OFFICE O/P EST LOW 20 MIN: CPT | Mod: S$GLB,,, | Performed by: NURSE PRACTITIONER

## 2019-04-26 PROCEDURE — 99213 PR OFFICE/OUTPT VISIT, EST, LEVL III, 20-29 MIN: ICD-10-PCS | Mod: S$GLB,,, | Performed by: NURSE PRACTITIONER

## 2019-04-26 PROCEDURE — 3074F PR MOST RECENT SYSTOLIC BLOOD PRESSURE < 130 MM HG: ICD-10-PCS | Mod: CPTII,S$GLB,, | Performed by: NURSE PRACTITIONER

## 2019-04-26 PROCEDURE — 3079F PR MOST RECENT DIASTOLIC BLOOD PRESSURE 80-89 MM HG: ICD-10-PCS | Mod: CPTII,S$GLB,, | Performed by: NURSE PRACTITIONER

## 2019-04-26 PROCEDURE — 3074F SYST BP LT 130 MM HG: CPT | Mod: CPTII,S$GLB,, | Performed by: NURSE PRACTITIONER

## 2019-04-26 PROCEDURE — 1101F PT FALLS ASSESS-DOCD LE1/YR: CPT | Mod: CPTII,S$GLB,, | Performed by: NURSE PRACTITIONER

## 2019-04-26 PROCEDURE — 1101F PR PT FALLS ASSESS DOC 0-1 FALLS W/OUT INJ PAST YR: ICD-10-PCS | Mod: CPTII,S$GLB,, | Performed by: NURSE PRACTITIONER

## 2019-04-26 PROCEDURE — 3079F DIAST BP 80-89 MM HG: CPT | Mod: CPTII,S$GLB,, | Performed by: NURSE PRACTITIONER

## 2019-04-26 RX ORDER — SILDENAFIL CITRATE 20 MG/1
20 TABLET ORAL 3 TIMES DAILY
Qty: 30 TABLET | Refills: 3 | Status: SHIPPED | OUTPATIENT
Start: 2019-04-26 | End: 2023-06-07

## 2019-04-26 RX ORDER — QUETIAPINE FUMARATE 100 MG/1
100 TABLET, FILM COATED ORAL NIGHTLY
Qty: 30 TABLET | Refills: 6 | Status: SHIPPED | OUTPATIENT
Start: 2019-04-26 | End: 2019-12-04 | Stop reason: SDUPTHER

## 2019-04-26 NOTE — PROGRESS NOTES
Chief Complaint  Chief Complaint   Patient presents with    Medication Refill       HPI:  Betzy Ortega is a 68 y.o. male with medical diagnoses as listed and reviewed within the medical history and problem list that presents for multiple complaints    Fatigue - pt reports that he feels weak and tired all the time. He says that he is unable to keep his arms above his head to even wash his hair. This started about 2 months ago.    Erectile dysfunction - pt is able to get an erection but is unable to maintain the erection or come to a climax. He denies any history of this until about a year ago. Denies dysuria or bleeding. He denies any pain or discomfort in his penis or testicles. No sores or drainage.     He was started on multiple medications for anxiety and depression by  in 2016 when he had a stroke and MI. He was educated today on the side effects of these medications and the long term effects on his body. He is also reporting that he is unable to stay asleep at night once he falls asleep. Reviewing his medications I am going to refer him to psyche to possibly adjust this regimen.      PAST MEDICAL HISTORY:  Past Medical History:   Diagnosis Date    Anxiety     Cataract     Depression     Disorder of kidney and ureter     Hypertension     Lupus     Stroke 04/2017       PAST SURGICAL HISTORY:  Past Surgical History:   Procedure Laterality Date    carpel tunnel hand Bilateral     cataract surgery Left     cervical fusion cadivor bone      KNEE SURGERY Left     OSTEOMYLITIS       SOCIAL HISTORY:  Social History     Socioeconomic History    Marital status:      Spouse name: Not on file    Number of children: Not on file    Years of education: Not on file    Highest education level: Not on file   Occupational History    Not on file   Social Needs    Financial resource strain: Not on file    Food insecurity:     Worry: Not on file     Inability: Not on file    Transportation needs:      Medical: Not on file     Non-medical: Not on file   Tobacco Use    Smoking status: Never Smoker   Substance and Sexual Activity    Alcohol use: No     Frequency: Never    Drug use: No    Sexual activity: Not on file   Lifestyle    Physical activity:     Days per week: Not on file     Minutes per session: Not on file    Stress: Not on file   Relationships    Social connections:     Talks on phone: Not on file     Gets together: Not on file     Attends Rastafari service: Not on file     Active member of club or organization: Not on file     Attends meetings of clubs or organizations: Not on file     Relationship status: Not on file   Other Topics Concern    Not on file   Social History Narrative    Not on file       FAMILY HISTORY:  Family History   Problem Relation Age of Onset    Cancer Mother     Prostate cancer Father     Pancreatic cancer Brother        ALLERGIES AND MEDICATIONS: updated and reviewed.  Review of patient's allergies indicates:   Allergen Reactions    Abilify [aripiprazole]     Dapsone     Hydrochlorothiazide     Losartan      Current Outpatient Medications   Medication Sig Dispense Refill    amLODIPine (NORVASC) 5 MG tablet Take 1 tablet (5 mg total) by mouth once daily. 90 tablet 1    aspirin (ECOTRIN) 81 MG EC tablet Take 81 mg by mouth once daily.      buPROPion (WELLBUTRIN XL) 300 MG 24 hr tablet bupropion hcl xl 300 mg tb24      busPIRone (BUSPAR) 15 MG tablet Take 1 tablet (15 mg total) by mouth 2 (two) times daily. 180 tablet 1    CARVEDILOL PHOSPHATE 20 MG ORAL CM24 (COREG CR) 20 mg 24 hr capsule Take 20 mg by mouth once daily.      citalopram (CELEXA) 20 MG tablet citalopram 20 mg tablet      lisinopril (PRINIVIL,ZESTRIL) 20 MG tablet lisinopril 20 mg tablet   TAKE 1 TABLET EVERY DAY      ondansetron (ZOFRAN) 4 MG tablet Take 1 tablet (4 mg total) by mouth every 6 (six) hours as needed for Nausea. 30 tablet 0    oxyCODONE-acetaminophen (PERCOCET)  mg per  tablet Take 1 tablet by mouth every 6 (six) hours as needed for Pain. 40 tablet 0    pantoprazole (PROTONIX) 40 MG tablet Take 1 tablet (40 mg total) by mouth once daily. 90 tablet 1    QUEtiapine (SEROQUEL) 100 MG Tab Take 1 tablet (100 mg total) by mouth every evening. 30 tablet 6    sildenafil (REVATIO) 20 mg Tab Take 1 tablet (20 mg total) by mouth 3 (three) times daily. 30 tablet 3    sulfaSALAzine (AZULFIDINE) 500 mg Tab sulfasalazine 500 mg tablet   TAKE 1 TABLET BY MOUTH 3 TIMES A DAY AS NEEDED      tamsulosin (FLOMAX) 0.4 mg Cap tamsulosin 0.4 mg capsule   TAKE 1 CAPSULE BY MOUTH EVERYDAY      traZODone (DESYREL) 100 MG tablet Take 2 tablets (200 mg total) by mouth once daily. 180 tablet 3     No current facility-administered medications for this visit.          ROS  Review of Systems   Constitutional: Positive for fatigue. Negative for appetite change, chills and fever.   HENT: Negative for congestion, postnasal drip, rhinorrhea and sore throat.    Respiratory: Negative for cough, chest tightness, shortness of breath and wheezing.    Cardiovascular: Negative for chest pain and palpitations.   Gastrointestinal: Negative for abdominal distention, abdominal pain, constipation, diarrhea, nausea and vomiting.   Endocrine: Negative for cold intolerance, heat intolerance, polydipsia, polyphagia and polyuria.   Genitourinary: Negative for decreased urine volume, difficulty urinating, discharge, dysuria, flank pain, frequency, genital sores, hematuria, penile pain, penile swelling, scrotal swelling, testicular pain and urgency.        ED   Musculoskeletal: Positive for myalgias.        Muscle fatigue   Skin: Negative for color change and rash.   Allergic/Immunologic: Negative for immunocompromised state.   Neurological: Positive for weakness. Negative for dizziness and headaches.   Psychiatric/Behavioral: Positive for dysphoric mood and sleep disturbance. Negative for confusion. The patient is  "nervous/anxious.            PHYSICAL EXAM  Vitals:    04/26/19 0842   BP: 129/86   BP Location: Right arm   Patient Position: Sitting   Pulse: 69   Resp: 20   SpO2: 97%   Weight: 94.8 kg (209 lb)   Height: 5' 8" (1.727 m)    Body mass index is 31.78 kg/m².  Weight: 94.8 kg (209 lb)   Height: 5' 8" (172.7 cm)       Physical Exam   Constitutional: He is oriented to person, place, and time. He appears well-developed and well-nourished.   HENT:   Head: Normocephalic.   Eyes: Pupils are equal, round, and reactive to light.   Neck: Normal range of motion. Neck supple.   Cardiovascular: Normal rate, regular rhythm, S1 normal and S2 normal.   No murmur heard.  Pulmonary/Chest: Effort normal and breath sounds normal. He has no wheezes.   Abdominal: Soft. Normal appearance and bowel sounds are normal. He exhibits no distension. There is no tenderness.   Musculoskeletal: Normal range of motion.   Neurological: He is alert and oriented to person, place, and time.   Skin: Skin is warm and dry. Capillary refill takes less than 2 seconds.   Psychiatric: He has a normal mood and affect. His speech is normal. Thought content normal. He is withdrawn. Cognition and memory are normal.   Vitals reviewed.        Health Maintenance       Date Due Completion Date    Hepatitis C Screening 1950 ---    Lipid Panel 1950 ---    TETANUS VACCINE 07/24/1967 ---    Colonoscopy 07/24/1999 ---    Zoster Vaccine 07/24/2009 ---    Pneumococcal Vaccine (65+ Low/Medium Risk) (1 of 2 - PCV13) 07/24/2014 ---    Influenza Vaccine 08/01/2018 10/16/2014               Assessment & Plan    Betzy was seen today for medication refill.    Diagnoses and all orders for this visit:    Peripheral muscle fatigue  -     Vitamin D; Future  -     Vitamin B12; Future    Erectile dysfunction, unspecified erectile dysfunction type  -     sildenafil (REVATIO) 20 mg Tab; Take 1 tablet (20 mg total) by mouth 3 (three) times daily.    Fatigue, unspecified " type    Encounter for screening for malignant neoplasm of prostate  -     Prostate Specific Antigen, Diagnostic; Future    Essential hypertension  -     CBC auto differential; Future  -     Comprehensive metabolic panel; Future  -     Lipid panel; Future    History of CVA (cerebrovascular accident)  -     CBC auto differential; Future  -     Comprehensive metabolic panel; Future  -     Lipid panel; Future    History of MI (myocardial infarction)  -     CBC auto differential; Future  -     Comprehensive metabolic panel; Future  -     Lipid panel; Future    Primary insomnia  -     QUEtiapine (SEROQUEL) 100 MG Tab; Take 1 tablet (100 mg total) by mouth every evening.    Body mass index (BMI) of 31.0-31.9 in adult   -     Vitamin D; Future    Nutritional anemia   -     Vitamin B12; Future    Benign prostatic hyperplasia with nocturia   -     Prostate Specific Antigen, Diagnostic; Future    Severe episode of recurrent major depressive disorder, without psychotic features  -     Ambulatory referral to Psychiatry    - Pt educated on medications, side effects, and possible need to change doses and regimen. Will refer to Psychiatry.   - will get labs and call him with results and POC.     Follow-up: Follow up in about 3 months (around 7/26/2019).      Risks, benefits, and side effects were discussed with the patient. All questions were answered to the fullest satisfaction of the patient, and pt verbalized understanding and agreement to treatment plan. Pt was to call with any new or worsening symptoms, or present to the ER.

## 2019-04-29 ENCOUNTER — LAB VISIT (OUTPATIENT)
Dept: LAB | Facility: HOSPITAL | Age: 69
End: 2019-04-29
Attending: NURSE PRACTITIONER
Payer: MEDICARE

## 2019-04-29 DIAGNOSIS — M62.89 PERIPHERAL MUSCLE FATIGUE: ICD-10-CM

## 2019-04-29 DIAGNOSIS — N40.1 BENIGN PROSTATIC HYPERPLASIA WITH NOCTURIA: ICD-10-CM

## 2019-04-29 DIAGNOSIS — D53.9 NUTRITIONAL ANEMIA: ICD-10-CM

## 2019-04-29 DIAGNOSIS — Z86.73 HISTORY OF CVA (CEREBROVASCULAR ACCIDENT): ICD-10-CM

## 2019-04-29 DIAGNOSIS — I25.2 HISTORY OF MI (MYOCARDIAL INFARCTION): ICD-10-CM

## 2019-04-29 DIAGNOSIS — I10 ESSENTIAL HYPERTENSION: ICD-10-CM

## 2019-04-29 DIAGNOSIS — Z12.5 ENCOUNTER FOR SCREENING FOR MALIGNANT NEOPLASM OF PROSTATE: ICD-10-CM

## 2019-04-29 DIAGNOSIS — R35.1 BENIGN PROSTATIC HYPERPLASIA WITH NOCTURIA: ICD-10-CM

## 2019-04-29 LAB
25(OH)D3+25(OH)D2 SERPL-MCNC: 7 NG/ML (ref 30–96)
ALBUMIN SERPL BCP-MCNC: 3.9 G/DL (ref 3.5–5.2)
ALP SERPL-CCNC: 54 U/L (ref 55–135)
ALT SERPL W/O P-5'-P-CCNC: 16 U/L (ref 10–44)
ANION GAP SERPL CALC-SCNC: 9 MMOL/L (ref 8–16)
AST SERPL-CCNC: 17 U/L (ref 10–40)
BASOPHILS # BLD AUTO: 0.01 K/UL (ref 0–0.2)
BASOPHILS NFR BLD: 0.2 % (ref 0–1.9)
BILIRUB SERPL-MCNC: 1 MG/DL (ref 0.1–1)
BUN SERPL-MCNC: 12 MG/DL (ref 8–23)
CALCIUM SERPL-MCNC: 9 MG/DL (ref 8.7–10.5)
CHLORIDE SERPL-SCNC: 105 MMOL/L (ref 95–110)
CHOLEST SERPL-MCNC: 169 MG/DL (ref 120–199)
CHOLEST/HDLC SERPL: 3.8 {RATIO} (ref 2–5)
CO2 SERPL-SCNC: 24 MMOL/L (ref 23–29)
COMPLEXED PSA SERPL-MCNC: 1.8 NG/ML (ref 0–4)
CREAT SERPL-MCNC: 1.2 MG/DL (ref 0.5–1.4)
DIFFERENTIAL METHOD: ABNORMAL
EOSINOPHIL # BLD AUTO: 0 K/UL (ref 0–0.5)
EOSINOPHIL NFR BLD: 0 % (ref 0–8)
ERYTHROCYTE [DISTWIDTH] IN BLOOD BY AUTOMATED COUNT: 12.4 % (ref 11.5–14.5)
EST. GFR  (AFRICAN AMERICAN): >60 ML/MIN/1.73 M^2
EST. GFR  (NON AFRICAN AMERICAN): >60 ML/MIN/1.73 M^2
GLUCOSE SERPL-MCNC: 97 MG/DL (ref 70–110)
HCT VFR BLD AUTO: 47.9 % (ref 40–54)
HDLC SERPL-MCNC: 44 MG/DL (ref 40–75)
HDLC SERPL: 26 % (ref 20–50)
HGB BLD-MCNC: 16.1 G/DL (ref 14–18)
IMM GRANULOCYTES # BLD AUTO: 0.02 K/UL (ref 0–0.04)
IMM GRANULOCYTES NFR BLD AUTO: 0.5 % (ref 0–0.5)
LDLC SERPL CALC-MCNC: 104.2 MG/DL (ref 63–159)
LYMPHOCYTES # BLD AUTO: 0.9 K/UL (ref 1–4.8)
LYMPHOCYTES NFR BLD: 20.1 % (ref 18–48)
MCH RBC QN AUTO: 28 PG (ref 27–31)
MCHC RBC AUTO-ENTMCNC: 33.6 G/DL (ref 32–36)
MCV RBC AUTO: 83 FL (ref 82–98)
MONOCYTES # BLD AUTO: 0.3 K/UL (ref 0.3–1)
MONOCYTES NFR BLD: 7.5 % (ref 4–15)
NEUTROPHILS # BLD AUTO: 3.1 K/UL (ref 1.8–7.7)
NEUTROPHILS NFR BLD: 71.7 % (ref 38–73)
NONHDLC SERPL-MCNC: 125 MG/DL
NRBC BLD-RTO: 0 /100 WBC
PLATELET # BLD AUTO: 109 K/UL (ref 150–350)
PMV BLD AUTO: 10.5 FL (ref 9.2–12.9)
POTASSIUM SERPL-SCNC: 3.6 MMOL/L (ref 3.5–5.1)
PROT SERPL-MCNC: 6.8 G/DL (ref 6–8.4)
RBC # BLD AUTO: 5.76 M/UL (ref 4.6–6.2)
SODIUM SERPL-SCNC: 138 MMOL/L (ref 136–145)
TRIGL SERPL-MCNC: 104 MG/DL (ref 30–150)
VIT B12 SERPL-MCNC: 529 PG/ML (ref 210–950)
WBC # BLD AUTO: 4.27 K/UL (ref 3.9–12.7)

## 2019-04-29 PROCEDURE — 82607 VITAMIN B-12: CPT

## 2019-04-29 PROCEDURE — 85025 COMPLETE CBC W/AUTO DIFF WBC: CPT

## 2019-04-29 PROCEDURE — 80053 COMPREHEN METABOLIC PANEL: CPT

## 2019-04-29 PROCEDURE — 36415 COLL VENOUS BLD VENIPUNCTURE: CPT

## 2019-04-29 PROCEDURE — 82306 VITAMIN D 25 HYDROXY: CPT

## 2019-04-29 PROCEDURE — 80061 LIPID PANEL: CPT

## 2019-04-29 PROCEDURE — 84153 ASSAY OF PSA TOTAL: CPT

## 2019-04-30 RX ORDER — ERGOCALCIFEROL 1.25 MG/1
50000 CAPSULE ORAL
Qty: 24 CAPSULE | Refills: 0 | Status: SHIPPED | OUTPATIENT
Start: 2019-05-02 | End: 2020-01-13 | Stop reason: CLARIF

## 2019-05-02 DIAGNOSIS — Z12.11 COLON CANCER SCREENING: ICD-10-CM

## 2019-05-02 DIAGNOSIS — Z11.59 NEED FOR HEPATITIS C SCREENING TEST: ICD-10-CM

## 2019-05-09 RX ORDER — TAMSULOSIN HYDROCHLORIDE 0.4 MG/1
CAPSULE ORAL
Qty: 90 CAPSULE | Refills: 1 | Status: SHIPPED | OUTPATIENT
Start: 2019-05-09

## 2019-05-09 RX ORDER — TAMSULOSIN HYDROCHLORIDE 0.4 MG/1
CAPSULE ORAL
Qty: 90 CAPSULE | Refills: 1 | Status: SHIPPED | OUTPATIENT
Start: 2019-05-09 | End: 2019-05-09 | Stop reason: SDUPTHER

## 2019-05-09 NOTE — TELEPHONE ENCOUNTER
+Refill request(s). Please advise. Thank you.      ----- Message from Yasmine Eagle sent at 5/9/2019 11:12 AM CDT -----  Contact: Patient  Type:  RX Refill Request    Who Called:  Patient  Refill or New Rx:  Refill  RX Name and Strength:  tamsulosin (FLOMAX) 0.4 mg Cap  Preferred Pharmacy with phone number:      [facility] [address] [place] [address] AT [facility] [address] [address]  Phone: [phone] Fax: [phone]    Local or Mail Order:  Local  Ordering Provider:  Dr.Reeves Regalado Call Back Number:  [phone]   Additional Information:  Please contact to advise

## 2019-06-18 RX ORDER — SULFASALAZINE 500 MG/1
TABLET ORAL
Qty: 90 TABLET | Refills: 3 | Status: SHIPPED | OUTPATIENT
Start: 2019-06-18 | End: 2020-04-06 | Stop reason: SDUPTHER

## 2019-06-18 NOTE — TELEPHONE ENCOUNTER
----- Message from Dodie Roberts sent at 6/18/2019  2:09 PM CDT -----  Contact: self  Type:  RX Refill Request    Who Called:  self  Refill or New Rx:  refill  RX Name and Strength:  sulfaSALAzine (AZULFIDINE) 500 mg Tab  How is the patient currently taking it? (ex. 1XDay):  3Xday  Is this a 30 day or 90 day RX:  90  Preferred Pharmacy with phone number:  [facility]  Local or Mail Order:  local  Ordering Provider:  Marleen Scott  Best Call Back Number:  [phone]  Additional Information:  Patient is out of medication. Please call patient when sent. Thanks!   [facility] [address] [place] [address] [address]  [address] [place] [address]  Phone: [phone] Fax: [phone]

## 2019-06-20 ENCOUNTER — PATIENT OUTREACH (OUTPATIENT)
Dept: ADMINISTRATIVE | Facility: HOSPITAL | Age: 69
End: 2019-06-20

## 2019-09-09 ENCOUNTER — TELEPHONE (OUTPATIENT)
Dept: FAMILY MEDICINE | Facility: CLINIC | Age: 69
End: 2019-09-09

## 2019-09-09 RX ORDER — KETOCONAZOLE 20 MG/G
CREAM TOPICAL DAILY
Qty: 1 TUBE | Refills: 0 | Status: SHIPPED | OUTPATIENT
Start: 2019-09-09 | End: 2020-01-13 | Stop reason: CLARIF

## 2019-09-09 NOTE — TELEPHONE ENCOUNTER
Duplicate. See other encounter    ----- Message from Dee Montague sent at 9/9/2019 12:28 PM CDT -----  Contact: Patient  Type: Needs Medical Advice    Who Called: Patient  Pharmacy name and phone #:    [id number] [place] [place] - [address]  [address]  [place] [address]  Phone: [phone] Fax: [phone]    Best Call Back Number: [phone]  Additional Information: Patient is stating that he has a really bad fungus rash on his shin and he would like a fungal cream called in.Please call back and advise.

## 2019-09-09 NOTE — TELEPHONE ENCOUNTER
"Spoke with pt. Requested to know how pt knows it is a fungus. Pt states "The pharmacist recommended anti fungal medication; I showed it to them and that's what they said, and the medication I got from the pharmacy is not strong enough because it is not going away. I think the bandage is causing a fungus because the abrasion is heeling".     ----- Message from Latoya Duke sent at 9/9/2019  1:49 PM CDT -----  Type: Needs Medical Advice    Who Called:  Betzy  Symptoms (please be specific):  Fungus on shin from an abrasion  How long has patient had these symptoms:  2 weeks  Pharmacy name and phone #:    [facility] #[id number] - [address] [address] [address] [facility] [address]  [address] [address]  Phone: [phone] Fax: [phone]  Best Call Back Number: [phone]  Additional Information: He said this is his second call.  He has been using a topical for athletes foot cream.  The pharmacist recommended it.  He said it is not working.  Thank you!    "

## 2019-09-16 RX ORDER — PANTOPRAZOLE SODIUM 40 MG/1
TABLET, DELAYED RELEASE ORAL
Qty: 90 TABLET | Refills: 3 | Status: SHIPPED | OUTPATIENT
Start: 2019-09-16 | End: 2023-10-16 | Stop reason: SDUPTHER

## 2019-09-16 RX ORDER — BUSPIRONE HYDROCHLORIDE 15 MG/1
TABLET ORAL
Qty: 180 TABLET | Refills: 3 | Status: SHIPPED | OUTPATIENT
Start: 2019-09-16 | End: 2023-09-05 | Stop reason: SDUPTHER

## 2019-09-16 NOTE — TELEPHONE ENCOUNTER
----- Message from Dora Costa sent at 9/16/2019 11:36 AM CDT -----  Type:  RX Refill Request    Who Called:  Patient  RX Name and Strength:  pantoprazole (PROTONIX) 40 MG tablet and busPIRone (BUSPAR) 15 MG tablet  Preferred Pharmacy with phone number:  [facility]  Best Call Back Number: [phone]  Additional Information:

## 2019-09-19 ENCOUNTER — TELEPHONE (OUTPATIENT)
Dept: FAMILY MEDICINE | Facility: CLINIC | Age: 69
End: 2019-09-19

## 2019-09-19 RX ORDER — KETOCONAZOLE 20 MG/G
CREAM TOPICAL DAILY
Qty: 60 G | Refills: 0 | Status: SHIPPED | OUTPATIENT
Start: 2019-09-19 | End: 2020-01-13 | Stop reason: CLARIF

## 2019-09-19 NOTE — TELEPHONE ENCOUNTER
----- Message from Amena  sent at 9/19/2019 11:46 AM CDT -----  Contact: pt  Type:  Patient Returning Call    Who Called:  pt  Who Left Message for Patient:  Marleni GUERRA  Does the patient know what this is regarding?:  Yes ketoconazole (NIZORAL) 2 % cream  Best Call Back Number:    Additional Information:  Patient is returning for Nurse, please call back,need refill

## 2019-09-19 NOTE — TELEPHONE ENCOUNTER
PT. STATES HE USED THE NIZORAL CREAM TWO - THREE TIMES A  DAY INSTEAD OF DAILY, AS PRESCRIBED, WOULD LIKE A REFILL IF POSSIBLE.

## 2019-10-15 RX ORDER — AMLODIPINE BESYLATE 5 MG/1
TABLET ORAL
Qty: 90 TABLET | Refills: 3 | Status: SHIPPED | OUTPATIENT
Start: 2019-10-15 | End: 2023-07-12

## 2019-10-31 ENCOUNTER — LAB VISIT (OUTPATIENT)
Dept: LAB | Facility: HOSPITAL | Age: 69
End: 2019-10-31
Attending: INTERNAL MEDICINE
Payer: MEDICARE

## 2019-10-31 DIAGNOSIS — R53.83 FATIGUE: ICD-10-CM

## 2019-10-31 DIAGNOSIS — I51.9 MYXEDEMA HEART DISEASE: ICD-10-CM

## 2019-10-31 DIAGNOSIS — Z12.5 SPECIAL SCREENING FOR MALIGNANT NEOPLASM OF PROSTATE: Primary | ICD-10-CM

## 2019-10-31 DIAGNOSIS — E03.9 MYXEDEMA HEART DISEASE: ICD-10-CM

## 2019-10-31 DIAGNOSIS — Z79.899 ENCOUNTER FOR LONG-TERM (CURRENT) USE OF OTHER MEDICATIONS: ICD-10-CM

## 2019-10-31 LAB
ALBUMIN SERPL BCP-MCNC: 4.1 G/DL (ref 3.5–5.2)
ALP SERPL-CCNC: 46 U/L (ref 55–135)
ALT SERPL W/O P-5'-P-CCNC: 24 U/L (ref 10–44)
ANION GAP SERPL CALC-SCNC: 8 MMOL/L (ref 8–16)
AST SERPL-CCNC: 22 U/L (ref 10–40)
BASOPHILS # BLD AUTO: 0 K/UL (ref 0–0.2)
BASOPHILS NFR BLD: 0 % (ref 0–1.9)
BILIRUB SERPL-MCNC: 1.3 MG/DL (ref 0.1–1)
BUN SERPL-MCNC: 18 MG/DL (ref 8–23)
CALCIUM SERPL-MCNC: 9.1 MG/DL (ref 8.7–10.5)
CHLORIDE SERPL-SCNC: 105 MMOL/L (ref 95–110)
CHOLEST SERPL-MCNC: 174 MG/DL (ref 120–199)
CHOLEST/HDLC SERPL: 3.9 {RATIO} (ref 2–5)
CO2 SERPL-SCNC: 25 MMOL/L (ref 23–29)
COMPLEXED PSA SERPL-MCNC: 2.5 NG/ML (ref 0–4)
CREAT SERPL-MCNC: 1.4 MG/DL (ref 0.5–1.4)
DIFFERENTIAL METHOD: ABNORMAL
EOSINOPHIL # BLD AUTO: 0 K/UL (ref 0–0.5)
EOSINOPHIL NFR BLD: 0 % (ref 0–8)
ERYTHROCYTE [DISTWIDTH] IN BLOOD BY AUTOMATED COUNT: 12.4 % (ref 11.5–14.5)
EST. GFR  (AFRICAN AMERICAN): 58.4 ML/MIN/1.73 M^2
EST. GFR  (NON AFRICAN AMERICAN): 50.5 ML/MIN/1.73 M^2
GLUCOSE SERPL-MCNC: 101 MG/DL (ref 70–110)
HCT VFR BLD AUTO: 47 % (ref 40–54)
HDLC SERPL-MCNC: 45 MG/DL (ref 40–75)
HDLC SERPL: 25.9 % (ref 20–50)
HGB BLD-MCNC: 16.2 G/DL (ref 14–18)
IMM GRANULOCYTES # BLD AUTO: 0.02 K/UL (ref 0–0.04)
IMM GRANULOCYTES NFR BLD AUTO: 0.4 % (ref 0–0.5)
LDLC SERPL CALC-MCNC: 95.2 MG/DL (ref 63–159)
LYMPHOCYTES # BLD AUTO: 1 K/UL (ref 1–4.8)
LYMPHOCYTES NFR BLD: 21.6 % (ref 18–48)
MCH RBC QN AUTO: 29.3 PG (ref 27–31)
MCHC RBC AUTO-ENTMCNC: 34.5 G/DL (ref 32–36)
MCV RBC AUTO: 85 FL (ref 82–98)
MONOCYTES # BLD AUTO: 0.4 K/UL (ref 0.3–1)
MONOCYTES NFR BLD: 8.3 % (ref 4–15)
NEUTROPHILS # BLD AUTO: 3.2 K/UL (ref 1.8–7.7)
NEUTROPHILS NFR BLD: 69.7 % (ref 38–73)
NONHDLC SERPL-MCNC: 129 MG/DL
NRBC BLD-RTO: 0 /100 WBC
PLATELET # BLD AUTO: 124 K/UL (ref 150–350)
PMV BLD AUTO: 10.8 FL (ref 9.2–12.9)
POTASSIUM SERPL-SCNC: 4 MMOL/L (ref 3.5–5.1)
PROT SERPL-MCNC: 7 G/DL (ref 6–8.4)
RBC # BLD AUTO: 5.52 M/UL (ref 4.6–6.2)
SODIUM SERPL-SCNC: 138 MMOL/L (ref 136–145)
TRIGL SERPL-MCNC: 169 MG/DL (ref 30–150)
TSH SERPL DL<=0.005 MIU/L-ACNC: 2.36 UIU/ML (ref 0.34–5.6)
WBC # BLD AUTO: 4.59 K/UL (ref 3.9–12.7)

## 2019-10-31 PROCEDURE — 84153 ASSAY OF PSA TOTAL: CPT

## 2019-10-31 PROCEDURE — 85025 COMPLETE CBC W/AUTO DIFF WBC: CPT

## 2019-10-31 PROCEDURE — 80061 LIPID PANEL: CPT

## 2019-10-31 PROCEDURE — 80053 COMPREHEN METABOLIC PANEL: CPT

## 2019-10-31 PROCEDURE — 84443 ASSAY THYROID STIM HORMONE: CPT

## 2019-10-31 PROCEDURE — 36415 COLL VENOUS BLD VENIPUNCTURE: CPT

## 2019-12-04 DIAGNOSIS — F51.01 PRIMARY INSOMNIA: ICD-10-CM

## 2019-12-04 RX ORDER — QUETIAPINE FUMARATE 100 MG/1
100 TABLET, FILM COATED ORAL NIGHTLY
Qty: 30 TABLET | Refills: 6 | Status: SHIPPED | OUTPATIENT
Start: 2019-12-04 | End: 2023-09-13 | Stop reason: SDUPTHER

## 2020-01-13 ENCOUNTER — HOSPITAL ENCOUNTER (EMERGENCY)
Facility: HOSPITAL | Age: 70
Discharge: HOME | End: 2020-01-14
Attending: FAMILY MEDICINE
Payer: MEDICARE

## 2020-01-13 DIAGNOSIS — I10 ESSENTIAL HYPERTENSION: Primary | ICD-10-CM

## 2020-01-13 DIAGNOSIS — R61 DIAPHORESIS: ICD-10-CM

## 2020-01-13 PROCEDURE — 99285 EMERGENCY DEPT VISIT HI MDM: CPT | Mod: 25

## 2020-01-13 PROCEDURE — 51798 US URINE CAPACITY MEASURE: CPT

## 2020-01-13 PROCEDURE — 36000 PLACE NEEDLE IN VEIN: CPT

## 2020-01-13 PROCEDURE — 93005 ELECTROCARDIOGRAM TRACING: CPT

## 2020-01-14 VITALS
BODY MASS INDEX: 31.83 KG/M2 | WEIGHT: 210 LBS | OXYGEN SATURATION: 96 % | SYSTOLIC BLOOD PRESSURE: 163 MMHG | RESPIRATION RATE: 17 BRPM | HEIGHT: 68 IN | HEART RATE: 72 BPM | DIASTOLIC BLOOD PRESSURE: 89 MMHG | TEMPERATURE: 98 F

## 2020-01-14 LAB
ALBUMIN SERPL BCP-MCNC: 4 G/DL (ref 3.5–5.2)
ALP SERPL-CCNC: 45 U/L (ref 55–135)
ALT SERPL W/O P-5'-P-CCNC: 21 U/L (ref 10–44)
ANION GAP SERPL CALC-SCNC: 9 MMOL/L (ref 8–16)
AST SERPL-CCNC: 24 U/L (ref 10–40)
BASOPHILS # BLD AUTO: 0.01 K/UL (ref 0–0.2)
BASOPHILS NFR BLD: 0.2 % (ref 0–1.9)
BILIRUB SERPL-MCNC: 0.8 MG/DL (ref 0.1–1)
BILIRUB UR QL STRIP: NEGATIVE
BUN SERPL-MCNC: 12 MG/DL (ref 8–23)
CALCIUM SERPL-MCNC: 8.6 MG/DL (ref 8.7–10.5)
CHLORIDE SERPL-SCNC: 104 MMOL/L (ref 95–110)
CLARITY UR: CLEAR
CO2 SERPL-SCNC: 27 MMOL/L (ref 23–29)
COLOR UR: YELLOW
CREAT SERPL-MCNC: 1.3 MG/DL (ref 0.5–1.4)
DIFFERENTIAL METHOD: ABNORMAL
EOSINOPHIL # BLD AUTO: 0 K/UL (ref 0–0.5)
EOSINOPHIL NFR BLD: 0.2 % (ref 0–8)
ERYTHROCYTE [DISTWIDTH] IN BLOOD BY AUTOMATED COUNT: 13.2 % (ref 11.5–14.5)
EST. GFR  (AFRICAN AMERICAN): >60 ML/MIN/1.73 M^2
EST. GFR  (NON AFRICAN AMERICAN): 55.3 ML/MIN/1.73 M^2
GLUCOSE SERPL-MCNC: 110 MG/DL (ref 70–110)
GLUCOSE UR QL STRIP: NEGATIVE
HCT VFR BLD AUTO: 43 % (ref 40–54)
HGB BLD-MCNC: 15 G/DL (ref 14–18)
HGB UR QL STRIP: ABNORMAL
IMM GRANULOCYTES # BLD AUTO: 0.03 K/UL (ref 0–0.04)
IMM GRANULOCYTES NFR BLD AUTO: 0.5 % (ref 0–0.5)
KETONES UR QL STRIP: NEGATIVE
LEUKOCYTE ESTERASE UR QL STRIP: NEGATIVE
LYMPHOCYTES # BLD AUTO: 1.3 K/UL (ref 1–4.8)
LYMPHOCYTES NFR BLD: 23.1 % (ref 18–48)
MCH RBC QN AUTO: 29.9 PG (ref 27–31)
MCHC RBC AUTO-ENTMCNC: 34.9 G/DL (ref 32–36)
MCV RBC AUTO: 86 FL (ref 82–98)
MONOCYTES # BLD AUTO: 0.4 K/UL (ref 0.3–1)
MONOCYTES NFR BLD: 7.7 % (ref 4–15)
NEUTROPHILS # BLD AUTO: 3.8 K/UL (ref 1.8–7.7)
NEUTROPHILS NFR BLD: 68.3 % (ref 38–73)
NITRITE UR QL STRIP: NEGATIVE
NRBC BLD-RTO: 0 /100 WBC
PH UR STRIP: 6 [PH] (ref 5–8)
PLATELET # BLD AUTO: 124 K/UL (ref 150–350)
PMV BLD AUTO: 11.1 FL (ref 9.2–12.9)
POTASSIUM SERPL-SCNC: 3.4 MMOL/L (ref 3.5–5.1)
PROT SERPL-MCNC: 6.7 G/DL (ref 6–8.4)
PROT UR QL STRIP: NEGATIVE
RBC # BLD AUTO: 5.01 M/UL (ref 4.6–6.2)
SODIUM SERPL-SCNC: 140 MMOL/L (ref 136–145)
SP GR UR STRIP: 1.01 (ref 1–1.03)
URN SPEC COLLECT METH UR: ABNORMAL
UROBILINOGEN UR STRIP-ACNC: NEGATIVE EU/DL
WBC # BLD AUTO: 5.58 K/UL (ref 3.9–12.7)

## 2020-01-14 PROCEDURE — 85025 COMPLETE CBC W/AUTO DIFF WBC: CPT

## 2020-01-14 PROCEDURE — 81003 URINALYSIS AUTO W/O SCOPE: CPT

## 2020-01-14 PROCEDURE — 80053 COMPREHEN METABOLIC PANEL: CPT

## 2020-01-14 NOTE — ED PROVIDER NOTES
Encounter Date: 1/13/2020       History     Chief Complaint   Patient presents with    Hypertension     [age] year male presents complaining of feeling tired week and having his blood pressure elevated has a history of anxiety and depression has been taking his medications as prescribed in fact he has doubled his dose of is anti hypertension med today an effort to bring his pressure down he is followed by primary care internal medicine physician Dr. Turner he has no chest pain        Review of patient's allergies indicates:   Allergen Reactions    Abilify [aripiprazole]     Dapsone     Hydrochlorothiazide     Losartan      Past Medical History:   Diagnosis Date    Anxiety     Cataract     Depression     Disorder of kidney and ureter     Hypertension     Lupus     Stroke 04/2017     Past Surgical History:   Procedure Laterality Date    carpel tunnel hand Bilateral     cataract surgery Left     cervical fusion cadivor bone      KNEE SURGERY Left     OSTEOMYLITIS     Family History   Problem Relation Age of Onset    Cancer Mother     Prostate cancer Father     Pancreatic cancer Brother      Social History     Tobacco Use    Smoking status: Never Smoker   Substance Use Topics    Alcohol use: No     Frequency: Never    Drug use: No     Review of Systems   Constitutional: Negative for fever.   HENT: Negative for sore throat.    Respiratory: Negative for shortness of breath.    Cardiovascular: Negative for chest pain.   Gastrointestinal: Negative for nausea.   Genitourinary: Negative for dysuria and flank pain.   Musculoskeletal: Negative for back pain.   Skin: Negative for rash.   Neurological: Negative for weakness.   Hematological: Does not bruise/bleed easily.       Physical Exam     Initial Vitals   BP Pulse Resp Temp SpO2   -- -- -- -- --      MAP       --         Physical Exam    Nursing note and vitals reviewed.  Constitutional: He appears well-developed and well-nourished. He is not  diaphoretic. No distress.   HENT:   Head: Normocephalic and atraumatic.   Right Ear: External ear normal.   Left Ear: External ear normal.   Nose: Nose normal.   Mouth/Throat: Oropharynx is clear and moist. No oropharyngeal exudate.   Eyes: EOM are normal.   Neck: Normal range of motion. Neck supple. No tracheal deviation present.   Cardiovascular: Normal rate and regular rhythm.   No murmur heard.  Pulmonary/Chest: Breath sounds normal. No stridor. No respiratory distress. He has no rales.   Abdominal: Soft. He exhibits no distension and no mass. There is no tenderness. There is no rebound.   Musculoskeletal: Normal range of motion. He exhibits no edema.   Lymphadenopathy:     He has no cervical adenopathy.   Neurological: He is alert and oriented to person, place, and time. He has normal strength.   Skin: Skin is warm and dry. Capillary refill takes less than 2 seconds. No pallor.   Psychiatric: He has a normal mood and affect.         ED Course   Procedures  Labs Reviewed - No data to display       Imaging Results    None                                          Clinical Impression:       ICD-10-CM ICD-9-CM   1. Essential hypertension I10 401.9   2. Diaphoresis R61 780.8                             Cirilo Velasquez MD  01/14/20 0602

## 2020-03-12 RX ORDER — TRAZODONE HYDROCHLORIDE 100 MG/1
TABLET ORAL
Qty: 180 TABLET | Refills: 3 | Status: SHIPPED | OUTPATIENT
Start: 2020-03-12 | End: 2023-09-05 | Stop reason: SDUPTHER

## 2020-03-25 ENCOUNTER — HOSPITAL ENCOUNTER (EMERGENCY)
Facility: HOSPITAL | Age: 70
Discharge: HOME | End: 2020-03-25
Attending: INTERNAL MEDICINE
Payer: MEDICARE

## 2020-03-25 VITALS
OXYGEN SATURATION: 96 % | RESPIRATION RATE: 28 BRPM | BODY MASS INDEX: 33.65 KG/M2 | DIASTOLIC BLOOD PRESSURE: 72 MMHG | HEART RATE: 71 BPM | SYSTOLIC BLOOD PRESSURE: 116 MMHG | WEIGHT: 222 LBS | HEIGHT: 68 IN | TEMPERATURE: 98 F

## 2020-03-25 DIAGNOSIS — M79.89 CALF SWELLING: ICD-10-CM

## 2020-03-25 DIAGNOSIS — R42 VERTIGO: Primary | ICD-10-CM

## 2020-03-25 DIAGNOSIS — I72.9 ANEURYSM: ICD-10-CM

## 2020-03-25 LAB
ANION GAP SERPL CALC-SCNC: 7 MMOL/L (ref 8–16)
BASOPHILS # BLD AUTO: 0 K/UL (ref 0–0.2)
BASOPHILS NFR BLD: 0 % (ref 0–1.9)
BNP SERPL-MCNC: 46 PG/ML (ref 0–99)
BUN SERPL-MCNC: 16 MG/DL (ref 8–23)
CALCIUM SERPL-MCNC: 8.8 MG/DL (ref 8.7–10.5)
CHLORIDE SERPL-SCNC: 107 MMOL/L (ref 95–110)
CO2 SERPL-SCNC: 25 MMOL/L (ref 23–29)
CREAT SERPL-MCNC: 1.5 MG/DL (ref 0.5–1.4)
DIFFERENTIAL METHOD: ABNORMAL
EOSINOPHIL # BLD AUTO: 0 K/UL (ref 0–0.5)
EOSINOPHIL NFR BLD: 0 % (ref 0–8)
ERYTHROCYTE [DISTWIDTH] IN BLOOD BY AUTOMATED COUNT: 12.4 % (ref 11.5–14.5)
EST. GFR  (AFRICAN AMERICAN): 53.8 ML/MIN/1.73 M^2
EST. GFR  (NON AFRICAN AMERICAN): 46.5 ML/MIN/1.73 M^2
GLUCOSE SERPL-MCNC: 125 MG/DL (ref 70–110)
HCT VFR BLD AUTO: 42.5 % (ref 40–54)
HGB BLD-MCNC: 14.5 G/DL (ref 14–18)
IMM GRANULOCYTES # BLD AUTO: 0.02 K/UL (ref 0–0.04)
IMM GRANULOCYTES NFR BLD AUTO: 0.4 % (ref 0–0.5)
LYMPHOCYTES # BLD AUTO: 0.9 K/UL (ref 1–4.8)
LYMPHOCYTES NFR BLD: 18.9 % (ref 18–48)
MCH RBC QN AUTO: 29.5 PG (ref 27–31)
MCHC RBC AUTO-ENTMCNC: 34.1 G/DL (ref 32–36)
MCV RBC AUTO: 86 FL (ref 82–98)
MONOCYTES # BLD AUTO: 0.4 K/UL (ref 0.3–1)
MONOCYTES NFR BLD: 7.2 % (ref 4–15)
NEUTROPHILS # BLD AUTO: 3.6 K/UL (ref 1.8–7.7)
NEUTROPHILS NFR BLD: 73.5 % (ref 38–73)
NRBC BLD-RTO: 0 /100 WBC
PLATELET # BLD AUTO: 130 K/UL (ref 150–350)
PMV BLD AUTO: 10.9 FL (ref 9.2–12.9)
POTASSIUM SERPL-SCNC: 3.5 MMOL/L (ref 3.5–5.1)
RBC # BLD AUTO: 4.92 M/UL (ref 4.6–6.2)
SODIUM SERPL-SCNC: 139 MMOL/L (ref 136–145)
WBC # BLD AUTO: 4.86 K/UL (ref 3.9–12.7)

## 2020-03-25 PROCEDURE — 70450 CT HEAD/BRAIN W/O DYE: CPT | Mod: TC

## 2020-03-25 PROCEDURE — 80048 BASIC METABOLIC PNL TOTAL CA: CPT

## 2020-03-25 PROCEDURE — 83880 ASSAY OF NATRIURETIC PEPTIDE: CPT

## 2020-03-25 PROCEDURE — 85025 COMPLETE CBC W/AUTO DIFF WBC: CPT

## 2020-03-25 PROCEDURE — 71046 X-RAY EXAM CHEST 2 VIEWS: CPT | Mod: 26,,, | Performed by: RADIOLOGY

## 2020-03-25 PROCEDURE — 70450 CT HEAD/BRAIN W/O DYE: CPT | Mod: 26,,, | Performed by: RADIOLOGY

## 2020-03-25 PROCEDURE — 70450 CT HEAD WITHOUT CONTRAST: ICD-10-PCS | Mod: 26,,, | Performed by: RADIOLOGY

## 2020-03-25 PROCEDURE — 36415 COLL VENOUS BLD VENIPUNCTURE: CPT

## 2020-03-25 PROCEDURE — 71046 XR CHEST PA AND LATERAL: ICD-10-PCS | Mod: 26,,, | Performed by: RADIOLOGY

## 2020-03-25 PROCEDURE — 93005 ELECTROCARDIOGRAM TRACING: CPT

## 2020-03-25 PROCEDURE — 71046 X-RAY EXAM CHEST 2 VIEWS: CPT | Mod: TC,FY

## 2020-03-25 PROCEDURE — 93010 EKG 12-LEAD: ICD-10-PCS | Mod: ,,, | Performed by: INTERNAL MEDICINE

## 2020-03-25 PROCEDURE — 99285 EMERGENCY DEPT VISIT HI MDM: CPT | Mod: 25

## 2020-03-25 PROCEDURE — 93010 ELECTROCARDIOGRAM REPORT: CPT | Mod: ,,, | Performed by: INTERNAL MEDICINE

## 2020-03-25 RX ORDER — ROSUVASTATIN CALCIUM 40 MG/1
40 TABLET, COATED ORAL NIGHTLY
Qty: 90 TABLET | Refills: 3 | Status: SHIPPED | OUTPATIENT
Start: 2020-03-25 | End: 2021-03-25

## 2020-03-25 NOTE — ED TRIAGE NOTES
Patient presents to ED POV with c/o swelling to his bilateral ankles. He denies SOB or history of CHF. He is AAOx4. Skin warm, dry to touch. Respirations even, nonlabored. Ambulatory with steady gait unassisted.

## 2020-03-25 NOTE — ED PROVIDER NOTES
Encounter Date: 3/25/2020       History     Chief Complaint   Patient presents with    Joint Swelling     HPI  Review of patient's allergies indicates:   Allergen Reactions    Abilify [aripiprazole]     Dapsone     Hydrochlorothiazide     Losartan      Past Medical History:   Diagnosis Date    Anxiety     Cataract     Depression     Disorder of kidney and ureter     Hypertension     Lupus     Stroke 04/2017     Past Surgical History:   Procedure Laterality Date    carpel tunnel hand Bilateral     cataract surgery Left     cervical fusion cadivor bone      KNEE SURGERY Left     OSTEOMYLITIS     Family History   Problem Relation Age of Onset    Cancer Mother     Prostate cancer Father     Pancreatic cancer Brother      Social History     Tobacco Use    Smoking status: Never Smoker   Substance Use Topics    Alcohol use: No     Frequency: Never    Drug use: No     Review of Systems    Physical Exam     Initial Vitals [03/25/20 1339]   BP Pulse Resp Temp SpO2   (!) 140/72 71 18 98.4 °F (36.9 °C) 98 %      MAP       --         Physical Exam    Nursing note and vitals reviewed.  Constitutional: Vital signs are normal. He appears well-developed and well-nourished. He is active and cooperative.   HENT:   Head: Normocephalic and atraumatic.   Eyes: Conjunctivae and lids are normal. Lids are everted and swept, no foreign bodies found.   Neck: Trachea normal, normal range of motion and full passive range of motion without pain. Neck supple.   Cardiovascular: Normal rate, regular rhythm, S1 normal, S2 normal, normal heart sounds, intact distal pulses and normal pulses.  No extrasystoles are present.    Abdominal: Soft. Normal appearance and bowel sounds are normal.   Musculoskeletal: Normal range of motion.   Neurological: He is alert. He has normal reflexes. GCS eye subscore is 4. GCS verbal subscore is 5. GCS motor subscore is 6.   Skin: Skin is warm, dry and intact. Capillary refill takes less than 2  seconds.   Psychiatric: He has a normal mood and affect. His speech is normal and behavior is normal. Cognition and memory are normal.         ED Course   Procedures  Labs Reviewed   CBC W/ AUTO DIFFERENTIAL - Abnormal; Notable for the following components:       Result Value    Platelets 130 (*)     Lymph # 0.9 (*)     Gran% 73.5 (*)     All other components within normal limits   BASIC METABOLIC PANEL - Abnormal; Notable for the following components:    Glucose 125 (*)     Creatinine 1.5 (*)     Anion Gap 7 (*)     eGFR if  53.8 (*)     eGFR if non  46.5 (*)     All other components within normal limits   B-TYPE NATRIURETIC PEPTIDE     EKG Readings: (Independently Interpreted)   Initial Reading: No STEMI. Rhythm: Normal Sinus Rhythm. Ectopy: No Ectopy. Conduction: Normal. ST Segments: Non-Specific ST Segment Depression. T Waves: Normal. Axis: Left Axis Deviation. Clinical Impression: Normal Sinus Rhythm and Non-specific ST Depression     ECG Results          EKG 12-lead (Final result)  Result time 03/25/20 14:23:45    Final result by Interface, Lab In [facility] (03/25/20 14:23:45)                 Narrative:    Test Reason : M79.89,    Vent. Rate : 070 BPM     Atrial Rate : 070 BPM     P-R Int : 148 ms          QRS Dur : 090 ms      QT Int : 392 ms       P-R-T Axes : 039 -43 045 degrees     QTc Int : 423 ms    Normal sinus rhythm  Left axis deviation  Abnormal ECG  When compared with ECG of 13-JAN-2020 23:36,  No significant change was found  Confirmed by Tejas Strickland MD ([id number]) on 3/25/2020 2:23:31 PM    Referred By:             Confirmed By:Tejas Strickland MD                            Imaging Results          X-Ray Chest PA And Lateral (Final result)  Result time 03/25/20 14:40:37    Final result by Cynthia Hinton MD (03/25/20 14:40:37)                 Impression:      No acute abnormality.      Electronically signed by: Cynthia Hinton  Date:    03/25/2020  Time:    14:40              Narrative:    EXAMINATION:  XR CHEST PA AND LATERAL    CLINICAL HISTORY:  Aneurysm of unspecified site    TECHNIQUE:  PA and lateral views of the chest were performed.    COMPARISON:  04/17/2017, 01/14/2020    FINDINGS:  The heart size is normal.  The francis are prominent but unchanged since 2017, perhaps due to prominent vessels.  There is chronic elevation of the right hemidiaphragm with minimal compressive atelectasis at the right lung base.  Lungs are otherwise clear.  There is no pleural effusion.  There is hardware in the cervical spine.                               CT Head Without Contrast (Final result)  Result time 03/25/20 14:38:51    Final result by Cynthia Hinton MD (03/25/20 14:38:51)                 Impression:      Normal.      Electronically signed by: Cynthia Hinton  Date:    03/25/2020  Time:    14:38             Narrative:    EXAMINATION:  CT HEAD WITHOUT CONTRAST    CLINICAL HISTORY:  Dizziness;    TECHNIQUE:  Low dose axial images were obtained through the head.  Coronal and sagittal reformations were also performed. Contrast was not administered.    COMPARISON:  NONE    FINDINGS:  There is no intra or extra-axial hemorrhage.  No mass effect, edema or midline shift is present.  The ventricular system and cortical sulcal markings are appropriate for the patient's age.  There is no acute or chronic infarction.    There is no skull fracture.  The visualized paranasal sinuses are clear.                                 Medical Decision Making:   Clinical Tests:   Lab Tests: Ordered and Reviewed  The following lab test(s) were unremarkable: CBC, CMP and BNP       <> Summary of Lab: Patient's labs are unremarkable.  Creatinine is 1.5 this should be repeated in the future.  Radiological Study: Ordered and Reviewed  Medical Tests: Ordered and Reviewed  ED Management:  CT scan of the brain is normal.  Chest x-ray and EKG were noncontributory.  I performed a carotid ultrasound on this patient and.   He had soft plaque in the right carotid bulb to between 20-30% stenosis.  This plaque was all soft plaque, indicating was recent, i.e. within the last 10 years.  The left carotid had similar plaque perhaps 20% with some calcific change.  Also mostly soft recent plaque.  Recommended he consider treating this.  I do not think this is contributory to his symptoms.  He is on baby aspirin this should continue.    I do not have a excellent explanation for his leaning to the left.  It is somewhat vertiginous and symptoms all G but not in others.  He has not had a stroke.    He is to follow-up with primary care doctor this early this next week                                 Clinical Impression:       ICD-10-CM ICD-9-CM   1. Vertigo R42 780.4   2. Calf swelling M79.89 729.81   3. Aneurysm I72.9 442.9         Disposition:   Disposition: Discharged  Condition: Stable     ED Disposition Condition    Discharge Stable        ED Prescriptions     Medication Sig Dispense Start Date End Date Auth. Provider    rosuvastatin (CRESTOR) 40 MG Tab Take 1 tablet (40 mg total) by mouth every evening. 90 tablet 3/25/2020 3/25/2021 Yinka Caraballo MD        Follow-up Information    None                                    Yinka Caraballo MD  03/25/20 1292       Yinka Caraballo MD  07/22/20 3489

## 2020-04-06 RX ORDER — SULFASALAZINE 500 MG/1
TABLET ORAL
Qty: 90 TABLET | Refills: 3 | Status: SHIPPED | OUTPATIENT
Start: 2020-04-06 | End: 2023-06-07

## 2020-05-20 ENCOUNTER — DOCUMENTATION ONLY (OUTPATIENT)
Dept: EMERGENCY MEDICINE | Facility: HOSPITAL | Age: 70
End: 2020-05-20

## 2020-05-20 NOTE — PROGRESS NOTES
Subjective:       Patient ID: Betzy Ortega is a 69 y.o. male.    Chief Complaint: No chief complaint on file.    Patient presents to the ER the history of leaning to the left when he walks.  He states he has been dizzy.  Feels like the room is moving.  States his pain on spontaneously with no signs symptoms.  Has not had any weakness.  No prior history of stroke.  No recent upper respiratory tract infections cause vestibular neuronitis.  No meds a might be ashok toxic.  No headache, no loss consciousness.  No other peripheral symptoms.        Review of Systems    Objective:      Physical Exam   Constitutional: He is oriented to person, place, and time. He appears well-developed and well-nourished.   HENT:   Head: Normocephalic and atraumatic.   Right Ear: External ear normal.   Left Ear: External ear normal.   Nose: Nose normal.   Mouth/Throat: Oropharynx is clear and moist.   Eyes: Pupils are equal, round, and reactive to light. Conjunctivae and EOM are normal.   Neck: Normal range of motion. Neck supple.   Cardiovascular: Normal rate, regular rhythm, normal heart sounds and intact distal pulses.   Pulmonary/Chest: Effort normal and breath sounds normal.   Abdominal: Soft. Bowel sounds are normal.   Musculoskeletal: Normal range of motion.   Neurological: He is alert and oriented to person, place, and time.   Neurological is completely negative.  No nystagmus.  No dysdiadochokinesia.  Strength is equal bilateral.  Neurological negative.  On standing the patient has a negative Romberg.  Toe toe finger finger normal   Skin: Skin is warm and dry.   Psychiatric: He has a normal mood and affect. His behavior is normal. Judgment and thought content normal.       Assessment:       No diagnosis found.    Plan:       There are no diagnoses linked to this encounter.

## 2020-05-24 ENCOUNTER — DOCUMENTATION ONLY (OUTPATIENT)
Dept: EMERGENCY MEDICINE | Facility: HOSPITAL | Age: 70
End: 2020-05-24

## 2021-01-13 ENCOUNTER — IMMUNIZATION (OUTPATIENT)
Dept: FAMILY MEDICINE | Facility: CLINIC | Age: 71
End: 2021-01-13
Payer: MEDICARE

## 2021-01-13 DIAGNOSIS — Z23 NEED FOR VACCINATION: ICD-10-CM

## 2021-01-13 PROCEDURE — 91301 COVID-19, MRNA, LNP-S, PF, 100 MCG/0.5 ML DOSE VACCINE: ICD-10-PCS | Mod: ,,, | Performed by: FAMILY MEDICINE

## 2021-01-13 PROCEDURE — 0011A COVID-19, MRNA, LNP-S, PF, 100 MCG/0.5 ML DOSE VACCINE: ICD-10-PCS | Mod: ,,, | Performed by: FAMILY MEDICINE

## 2021-01-13 PROCEDURE — 91301 COVID-19, MRNA, LNP-S, PF, 100 MCG/0.5 ML DOSE VACCINE: CPT | Mod: ,,, | Performed by: FAMILY MEDICINE

## 2021-01-13 PROCEDURE — 0011A COVID-19, MRNA, LNP-S, PF, 100 MCG/0.5 ML DOSE VACCINE: CPT | Mod: ,,, | Performed by: FAMILY MEDICINE

## 2021-02-10 ENCOUNTER — IMMUNIZATION (OUTPATIENT)
Dept: FAMILY MEDICINE | Facility: CLINIC | Age: 71
End: 2021-02-10
Payer: MEDICARE

## 2021-02-10 DIAGNOSIS — Z23 NEED FOR VACCINATION: Primary | ICD-10-CM

## 2021-02-10 PROCEDURE — 0012A COVID-19, MRNA, LNP-S, PF, 100 MCG/0.5 ML DOSE VACCINE: ICD-10-PCS | Mod: CV19,S$GLB,, | Performed by: FAMILY MEDICINE

## 2021-02-10 PROCEDURE — 91301 COVID-19, MRNA, LNP-S, PF, 100 MCG/0.5 ML DOSE VACCINE: CPT | Mod: S$GLB,,, | Performed by: FAMILY MEDICINE

## 2021-02-10 PROCEDURE — 0012A COVID-19, MRNA, LNP-S, PF, 100 MCG/0.5 ML DOSE VACCINE: CPT | Mod: CV19,S$GLB,, | Performed by: FAMILY MEDICINE

## 2021-02-10 PROCEDURE — 91301 COVID-19, MRNA, LNP-S, PF, 100 MCG/0.5 ML DOSE VACCINE: ICD-10-PCS | Mod: S$GLB,,, | Performed by: FAMILY MEDICINE

## 2022-01-06 ENCOUNTER — HOSPITAL ENCOUNTER (EMERGENCY)
Facility: HOSPITAL | Age: 72
Discharge: HOME | End: 2022-01-07
Attending: INTERNAL MEDICINE
Payer: MEDICARE

## 2022-01-06 VITALS
OXYGEN SATURATION: 97 % | HEART RATE: 81 BPM | HEIGHT: 68 IN | RESPIRATION RATE: 20 BRPM | BODY MASS INDEX: 34.1 KG/M2 | TEMPERATURE: 99 F | DIASTOLIC BLOOD PRESSURE: 88 MMHG | SYSTOLIC BLOOD PRESSURE: 132 MMHG | WEIGHT: 225 LBS

## 2022-01-06 DIAGNOSIS — T14.8XXA MUSCLE STRAIN: Primary | ICD-10-CM

## 2022-01-06 LAB
BASOPHILS # BLD AUTO: 0.01 K/UL (ref 0–0.2)
BASOPHILS NFR BLD: 0.2 % (ref 0–1.9)
BILIRUB UR QL STRIP: NEGATIVE
CLARITY UR: CLEAR
COLOR UR: YELLOW
DIFFERENTIAL METHOD: ABNORMAL
EOSINOPHIL # BLD AUTO: 0 K/UL (ref 0–0.5)
EOSINOPHIL NFR BLD: 0 % (ref 0–8)
ERYTHROCYTE [DISTWIDTH] IN BLOOD BY AUTOMATED COUNT: 12.9 % (ref 11.5–14.5)
GLUCOSE UR QL STRIP: NEGATIVE
HCT VFR BLD AUTO: 43.4 % (ref 40–54)
HGB BLD-MCNC: 14.5 G/DL (ref 14–18)
HGB UR QL STRIP: ABNORMAL
IMM GRANULOCYTES # BLD AUTO: 0.02 K/UL (ref 0–0.04)
IMM GRANULOCYTES NFR BLD AUTO: 0.3 % (ref 0–0.5)
KETONES UR QL STRIP: NEGATIVE
LEUKOCYTE ESTERASE UR QL STRIP: NEGATIVE
LYMPHOCYTES # BLD AUTO: 1.2 K/UL (ref 1–4.8)
LYMPHOCYTES NFR BLD: 20.5 % (ref 18–48)
MCH RBC QN AUTO: 28.3 PG (ref 27–31)
MCHC RBC AUTO-ENTMCNC: 33.4 G/DL (ref 32–36)
MCV RBC AUTO: 85 FL (ref 82–98)
MONOCYTES # BLD AUTO: 0.4 K/UL (ref 0.3–1)
MONOCYTES NFR BLD: 6.9 % (ref 4–15)
NEUTROPHILS # BLD AUTO: 4.2 K/UL (ref 1.8–7.7)
NEUTROPHILS NFR BLD: 72.1 % (ref 38–73)
NITRITE UR QL STRIP: NEGATIVE
NRBC BLD-RTO: 0 /100 WBC
PH UR STRIP: 5 [PH] (ref 5–8)
PLATELET # BLD AUTO: 125 K/UL (ref 150–450)
PMV BLD AUTO: 11.4 FL (ref 9.2–12.9)
PROT UR QL STRIP: NEGATIVE
RBC # BLD AUTO: 5.12 M/UL (ref 4.6–6.2)
SP GR UR STRIP: 1.02 (ref 1–1.03)
URN SPEC COLLECT METH UR: ABNORMAL
UROBILINOGEN UR STRIP-ACNC: NEGATIVE EU/DL
WBC # BLD AUTO: 5.8 K/UL (ref 3.9–12.7)

## 2022-01-06 PROCEDURE — 74176 CT RENAL STONE STUDY ABD PELVIS WO: ICD-10-PCS | Mod: 26,,, | Performed by: RADIOLOGY

## 2022-01-06 PROCEDURE — 81003 URINALYSIS AUTO W/O SCOPE: CPT | Performed by: INTERNAL MEDICINE

## 2022-01-06 PROCEDURE — 74176 CT ABD & PELVIS W/O CONTRAST: CPT | Mod: TC

## 2022-01-06 PROCEDURE — 99285 EMERGENCY DEPT VISIT HI MDM: CPT | Mod: 25

## 2022-01-06 PROCEDURE — 80053 COMPREHEN METABOLIC PANEL: CPT | Performed by: INTERNAL MEDICINE

## 2022-01-06 PROCEDURE — 20552 NJX 1/MLT TRIGGER POINT 1/2: CPT

## 2022-01-06 PROCEDURE — 85025 COMPLETE CBC W/AUTO DIFF WBC: CPT | Performed by: INTERNAL MEDICINE

## 2022-01-06 PROCEDURE — 74176 CT ABD & PELVIS W/O CONTRAST: CPT | Mod: 26,,, | Performed by: RADIOLOGY

## 2022-01-07 ENCOUNTER — TELEPHONE (OUTPATIENT)
Dept: EMERGENCY MEDICINE | Facility: HOSPITAL | Age: 72
End: 2022-01-07

## 2022-01-07 LAB
ALBUMIN SERPL BCP-MCNC: 4.2 G/DL (ref 3.5–5.2)
ALP SERPL-CCNC: 52 U/L (ref 55–135)
ALT SERPL W/O P-5'-P-CCNC: 18 U/L (ref 10–44)
ANION GAP SERPL CALC-SCNC: 15 MMOL/L (ref 8–16)
AST SERPL-CCNC: 18 U/L (ref 10–40)
BILIRUB SERPL-MCNC: 1.1 MG/DL (ref 0.1–1)
BUN SERPL-MCNC: 11 MG/DL (ref 8–23)
CALCIUM SERPL-MCNC: 9.6 MG/DL (ref 8.7–10.5)
CHLORIDE SERPL-SCNC: 105 MMOL/L (ref 95–110)
CO2 SERPL-SCNC: 21 MMOL/L (ref 23–29)
CREAT SERPL-MCNC: 1.4 MG/DL (ref 0.5–1.4)
EST. GFR  (AFRICAN AMERICAN): 57.6 ML/MIN/1.73 M^2
EST. GFR  (NON AFRICAN AMERICAN): 49.8 ML/MIN/1.73 M^2
GLUCOSE SERPL-MCNC: 101 MG/DL (ref 70–110)
POTASSIUM SERPL-SCNC: 4.3 MMOL/L (ref 3.5–5.1)
PROT SERPL-MCNC: 7.1 G/DL (ref 6–8.4)
SODIUM SERPL-SCNC: 141 MMOL/L (ref 136–145)

## 2022-01-07 PROCEDURE — 63600175 PHARM REV CODE 636 W HCPCS: Performed by: INTERNAL MEDICINE

## 2022-01-07 PROCEDURE — 96372 THER/PROPH/DIAG INJ SC/IM: CPT

## 2022-01-07 PROCEDURE — 25000003 PHARM REV CODE 250: Performed by: INTERNAL MEDICINE

## 2022-01-07 RX ORDER — CYCLOBENZAPRINE HCL 10 MG
10 TABLET ORAL 3 TIMES DAILY PRN
Qty: 15 TABLET | Refills: 0 | Status: SHIPPED | OUTPATIENT
Start: 2022-01-07 | End: 2022-01-12

## 2022-01-07 RX ORDER — LIDOCAINE HYDROCHLORIDE 10 MG/ML
5 INJECTION, SOLUTION EPIDURAL; INFILTRATION; INTRACAUDAL; PERINEURAL
Status: COMPLETED | OUTPATIENT
Start: 2022-01-07 | End: 2022-01-07

## 2022-01-07 RX ORDER — DEXAMETHASONE SODIUM PHOSPHATE 10 MG/ML
10 INJECTION INTRAMUSCULAR; INTRAVENOUS
Status: COMPLETED | OUTPATIENT
Start: 2022-01-07 | End: 2022-01-07

## 2022-01-07 RX ADMIN — DEXAMETHASONE SODIUM PHOSPHATE 10 MG: 10 INJECTION INTRAMUSCULAR; INTRAVENOUS at 12:01

## 2022-01-07 RX ADMIN — LIDOCAINE HYDROCHLORIDE 50 MG: 10 INJECTION, SOLUTION EPIDURAL; INFILTRATION; INTRACAUDAL; PERINEURAL at 12:01

## 2022-01-07 NOTE — ED PROVIDER NOTES
Encounter Date: 1/6/2022       History     Chief Complaint   Patient presents with    left flank pain      Onset 3 weeks pta      Patient comes in and with left flank pain of 3 weeks duration.  He states the pain is been constant.  Is not coming gone.  It has been like a boring pain.  It is worse when he moves left to right.  He has no history of strain or trauma precipitating the pain.  Does not hurt when he breathes.  Has no blood in the urine.  No history renal stones.  Not had a cough for any other signs of pulmonary infection.    On examination he had marked tenderness in the left paraspinous area at about T12-L1 to area.  A very distinct area of muscle spasm was palpated and corresponded to the pain he had.  He was not a very large area in the muscle was discretely localized by palpation.  Otherwise exam was unremarkable.    Patient has a history of acute renal failure in 2014 which required dialysis for on 2 occasions and then it resolved spontaneously.  Is concerned he might have kidney problems with renal failure.        Review of patient's allergies indicates:   Allergen Reactions    Abilify [aripiprazole]     Dapsone     Hydrochlorothiazide     Losartan      Past Medical History:   Diagnosis Date    Anxiety     Cataract     Depression     Disorder of kidney and ureter     Hypertension     Lupus     Stroke 04/2017     Past Surgical History:   Procedure Laterality Date    carpel tunnel hand Bilateral     cataract surgery Left     cervical fusion cadivor bone      KNEE SURGERY Left     OSTEOMYLITIS     Family History   Problem Relation Age of Onset    Cancer Mother     Prostate cancer Father     Pancreatic cancer Brother      Social History     Tobacco Use    Smoking status: Never Smoker    Smokeless tobacco: Never Used   Substance Use Topics    Alcohol use: No    Drug use: No     Review of Systems   Constitutional: Negative for fever.   HENT: Negative for sore throat.    Respiratory:  Negative for shortness of breath.    Cardiovascular: Negative for chest pain.   Gastrointestinal: Negative for nausea.   Genitourinary: Positive for flank pain. Negative for dysuria.   Musculoskeletal: Negative for back pain.   Skin: Negative for rash.   Neurological: Negative for weakness.   Hematological: Does not bruise/bleed easily.   All other systems reviewed and are negative.      Physical Exam     Initial Vitals [01/06/22 2148]   BP Pulse Resp Temp SpO2   132/88 81 20 99 °F (37.2 °C) 97 %      MAP       --         Physical Exam    Nursing note and vitals reviewed.  Constitutional: Vital signs are normal. He appears well-developed and well-nourished. He is active and cooperative.   HENT:   Head: Normocephalic and atraumatic.   Eyes: Conjunctivae and lids are normal. Lids are everted and swept, no foreign bodies found.   Neck: Trachea normal. Neck supple.   Normal range of motion.   Full passive range of motion without pain.     Cardiovascular: Normal rate, regular rhythm, S1 normal, S2 normal, normal heart sounds, intact distal pulses and normal pulses.  No extrasystoles are present.    Abdominal: Abdomen is soft. Normal appearance and bowel sounds are normal.   Musculoskeletal:         General: Normal range of motion.        Arms:       Cervical back: Full passive range of motion without pain, normal range of motion and neck supple.     Neurological: He is alert. He has normal reflexes. GCS eye subscore is 4. GCS verbal subscore is 5. GCS motor subscore is 6.   Skin: Skin is warm, dry and intact. Capillary refill takes less than 2 seconds.   Psychiatric: He has a normal mood and affect. His speech is normal and behavior is normal. Cognition and memory are normal.         ED Course   Procedures  Labs Reviewed   CBC W/ AUTO DIFFERENTIAL - Abnormal; Notable for the following components:       Result Value    Platelets 125 (*)     All other components within normal limits   COMPREHENSIVE METABOLIC PANEL -  Abnormal; Notable for the following components:    CO2 21 (*)     Total Bilirubin 1.1 (*)     Alkaline Phosphatase 52 (*)     eGFR if  57.6 (*)     eGFR if non  49.8 (*)     All other components within normal limits   URINALYSIS, REFLEX TO URINE CULTURE - Abnormal; Notable for the following components:    Occult Blood UA Trace (*)     All other components within normal limits    Narrative:     Preferred Collection Type->Urine, Clean Catch  Specimen Source->Urine          Imaging Results          CT Renal Stone Study ABD Pelvis WO (In process)               X-Rays:   Independently Interpreted Readings:   Abdomen: No masses. Liver: Normal. Gallbladder: Normal. Stomach: Normal. Pancreas: Normal.Large Bowel: Normal. Small Bowel: Normal. Vessels: Normal. Spleen is slightly enlarged.  Mild thickening of the bladder wall is noted suggesting possibly mild chronic obstruction.  No cause of pain in the left is noted     Medications   LIDOcaine (PF) 10 mg/ml (1%) injection 50 mg (50 mg Infiltration Given 1/7/22 0050)   dexAMETHasone sodium phos (PF) injection 10 mg (10 mg Intramuscular Given 1/7/22 0051)     Medical Decision Making:   Clinical Tests:   Lab Tests: Ordered and Reviewed  The following lab test(s) were unremarkable: CBC, CMP and Urinalysis       <> Summary of Lab: Laboratory studies or unremarkable and noncontributory  Radiological Study: Ordered and Reviewed  ED Management:  Patient comes in with left flank pain.  He has a very discrete area of pain on palpation the left paraspinous area at about the T12-L1 to area.  Is not a very large area perhaps size of 50 cent piece.  But was discretely palpated.  This pain is been going on for approximately 3 weeks and was increased by motion and movement.  Laboratory studies are noncontributory and CT scan showed no findings that would explain the pain.  He has several other mild chronic conditions compatible with age related  findings.    Patient was given a diagnostic trigger point injection of this muscle with xylocaine and a small amount of Decadron.  He had very substantial relief of the pain, confirming that is localized in the muscle.  He is given Flexeril to use.  He is to recommended follow-up his primary care physician to further evaluate this if the pain is continue                      Clinical Impression:   Final diagnoses:  [T14.8XXA] Muscle strain (Primary)          ED Disposition Condition    Discharge Stable        ED Prescriptions     Medication Sig Dispense Start Date End Date Auth. Provider    cyclobenzaprine (FLEXERIL) 10 MG tablet Take 1 tablet (10 mg total) by mouth 3 (three) times daily as needed for Muscle spasms. 15 tablet 1/7/2022 1/12/2022 Yinka Caraballo MD        Follow-up Information    None          Yinka Caraballo MD  01/07/22 0111

## 2022-01-24 ENCOUNTER — TELEPHONE (OUTPATIENT)
Dept: SURGERY | Facility: CLINIC | Age: 72
End: 2022-01-24
Payer: MEDICARE

## 2023-04-13 ENCOUNTER — TELEPHONE (OUTPATIENT)
Dept: FAMILY MEDICINE | Facility: CLINIC | Age: 73
End: 2023-04-13
Payer: MEDICARE

## 2023-04-13 NOTE — TELEPHONE ENCOUNTER
----- Message from Odell Sam sent at 4/13/2023  1:49 PM CDT -----  Contact: self  Type: Sooner Appointment Request        Caller is requesting a sooner appointment. Caller declined first available appointment listed below. Caller will not accept being placed on the waitlist and is requesting a message be sent to doctor.        Name of Caller: patient   Best Call Back Number: [phone]  Additional Information: Weak spells and shaking pt has been having this for a week, Pt states he doesn't know what's going on needs to be checked. Thanks

## 2023-04-17 ENCOUNTER — TELEPHONE (OUTPATIENT)
Dept: OTOLARYNGOLOGY | Facility: CLINIC | Age: 73
End: 2023-04-17
Payer: MEDICARE

## 2023-04-18 ENCOUNTER — OFFICE VISIT (OUTPATIENT)
Dept: OTOLARYNGOLOGY | Facility: CLINIC | Age: 73
End: 2023-04-18
Payer: MEDICARE

## 2023-04-18 VITALS
SYSTOLIC BLOOD PRESSURE: 113 MMHG | WEIGHT: 225.31 LBS | DIASTOLIC BLOOD PRESSURE: 71 MMHG | BODY MASS INDEX: 34.26 KG/M2

## 2023-04-18 DIAGNOSIS — H91.90 HEARING LOSS, UNSPECIFIED HEARING LOSS TYPE, UNSPECIFIED LATERALITY: ICD-10-CM

## 2023-04-18 DIAGNOSIS — H60.8X1 CHRONIC CONTACT OTITIS EXTERNA OF RIGHT EAR: Primary | ICD-10-CM

## 2023-04-18 PROCEDURE — 99999 PR PBB SHADOW E&M-EST. PATIENT-LVL II: CPT | Mod: PBBFAC,,, | Performed by: OTOLARYNGOLOGY

## 2023-04-18 PROCEDURE — 3074F PR MOST RECENT SYSTOLIC BLOOD PRESSURE < 130 MM HG: ICD-10-PCS | Mod: CPTII,S$GLB,, | Performed by: OTOLARYNGOLOGY

## 2023-04-18 PROCEDURE — 99999 PR PBB SHADOW E&M-EST. PATIENT-LVL II: ICD-10-PCS | Mod: PBBFAC,,, | Performed by: OTOLARYNGOLOGY

## 2023-04-18 PROCEDURE — 1101F PT FALLS ASSESS-DOCD LE1/YR: CPT | Mod: CPTII,S$GLB,, | Performed by: OTOLARYNGOLOGY

## 2023-04-18 PROCEDURE — 3078F DIAST BP <80 MM HG: CPT | Mod: CPTII,S$GLB,, | Performed by: OTOLARYNGOLOGY

## 2023-04-18 PROCEDURE — 3008F PR BODY MASS INDEX (BMI) DOCUMENTED: ICD-10-PCS | Mod: CPTII,S$GLB,, | Performed by: OTOLARYNGOLOGY

## 2023-04-18 PROCEDURE — 3288F PR FALLS RISK ASSESSMENT DOCUMENTED: ICD-10-PCS | Mod: CPTII,S$GLB,, | Performed by: OTOLARYNGOLOGY

## 2023-04-18 PROCEDURE — 3074F SYST BP LT 130 MM HG: CPT | Mod: CPTII,S$GLB,, | Performed by: OTOLARYNGOLOGY

## 2023-04-18 PROCEDURE — 1101F PR PT FALLS ASSESS DOC 0-1 FALLS W/OUT INJ PAST YR: ICD-10-PCS | Mod: CPTII,S$GLB,, | Performed by: OTOLARYNGOLOGY

## 2023-04-18 PROCEDURE — 99203 OFFICE O/P NEW LOW 30 MIN: CPT | Mod: S$GLB,,, | Performed by: OTOLARYNGOLOGY

## 2023-04-18 PROCEDURE — 99203 PR OFFICE/OUTPT VISIT, NEW, LEVL III, 30-44 MIN: ICD-10-PCS | Mod: S$GLB,,, | Performed by: OTOLARYNGOLOGY

## 2023-04-18 PROCEDURE — 3008F BODY MASS INDEX DOCD: CPT | Mod: CPTII,S$GLB,, | Performed by: OTOLARYNGOLOGY

## 2023-04-18 PROCEDURE — 3078F PR MOST RECENT DIASTOLIC BLOOD PRESSURE < 80 MM HG: ICD-10-PCS | Mod: CPTII,S$GLB,, | Performed by: OTOLARYNGOLOGY

## 2023-04-18 PROCEDURE — 3288F FALL RISK ASSESSMENT DOCD: CPT | Mod: CPTII,S$GLB,, | Performed by: OTOLARYNGOLOGY

## 2023-04-18 RX ORDER — DAPSONE 25 MG/1
TABLET ORAL
COMMUNITY
Start: 2021-01-20

## 2023-04-18 RX ORDER — ZOLPIDEM TARTRATE 10 MG/1
TABLET ORAL
COMMUNITY
Start: 2021-11-09 | End: 2023-06-07

## 2023-04-18 RX ORDER — TRAZODONE HYDROCHLORIDE 100 MG/1
TABLET ORAL
COMMUNITY
Start: 2021-09-13 | End: 2023-06-07 | Stop reason: SDUPTHER

## 2023-04-18 RX ORDER — ALPRAZOLAM 0.5 MG/1
TABLET ORAL
COMMUNITY
Start: 2023-04-11 | End: 2023-06-07 | Stop reason: SDUPTHER

## 2023-04-18 RX ORDER — NEOMYCIN SULFATE, POLYMYXIN B SULFATE AND DEXAMETHASONE 3.5; 10000; 1 MG/ML; [USP'U]/ML; MG/ML
SUSPENSION/ DROPS OPHTHALMIC
Qty: 5 ML | Refills: 3 | Status: SHIPPED | OUTPATIENT
Start: 2023-04-18 | End: 2023-06-07

## 2023-04-18 RX ORDER — ALPRAZOLAM 0.5 MG/1
0.5 TABLET ORAL
COMMUNITY
Start: 2022-08-16 | End: 2023-09-13 | Stop reason: SDUPTHER

## 2023-04-18 RX ORDER — CETIRIZINE HYDROCHLORIDE, PSEUDOEPHEDRINE HYDROCHLORIDE 5; 120 MG/1; MG/1
TABLET, FILM COATED, EXTENDED RELEASE ORAL
COMMUNITY
Start: 2021-12-01

## 2023-04-18 NOTE — PROGRESS NOTES
Subjective:       Patient ID: Betzy Ortega is a 72 y.o. male.    Chief Complaint: Ear Fullness (Pt c/o wax build up in both ears. )      This generally healthy 72-year-old comes in thinking he has some wax buildup partially because he has been noted to have some hearing loss by those around him.      He tells me that he sees some drainage of wax from the right side in the mornings especially and wanted to have that checked and also discuss hearing loss.    Ear Fullness         Objective:      ENT Physical Exam  Constitutional  Appearance: patient appears well-developed, well-nourished and well-groomed,  Communication/Voice: communication appropriate for developmental age; vocal quality normal;  Head and Face  Appearance: head appears normal, face appears normal and face appears atraumatic;  Salivary: glands normal;  Ear  Hearing: intact;  Auricles: right auricle normal; left auricle normal;  Ear Canals: left ear canal normal;  Tympanic Membranes: right tympanic membrane normal; left tympanic membrane normal;  Ear comments: On the left side he had a small ball of wax at the meatus that was normal and easy to remove and otherwise ear canal looks just fine    On the right side there is more of a moist film of liquid he wax that is not quite pus but it is more moist than I would expect otherwise healthy wax to be there is no inflammation or swelling his eardrum looks fine and he denies much in the way of itching maybe a bit but nothing notable.  Nose  External Nose: nares patent bilaterally; external nose normal;  Internal Nose: nasal mucosa normal; septum normal; bilateral inferior turbinates normal;  Oral Cavity/Oropharynx  Lips: normal;  Teeth: normal;  Gums: gingiva normal;  Tongue: normal;  Oral mucosa: normal;  Hard palate: normal;  Soft palate: normal;  Tonsils: normal;  Base of Tongue: normal;  Posterior pharyngeal wall: normal;  Neck  Neck: neck normal; neck palpation normal;  Thyroid: thyroid  normal;  Lymphatic  Palpation: lymph nodes normal;        Assessment:       1. Chronic contact otitis externa of right ear         Plan:          So I would just give him some antibiotics steroid drops to see if it would help with this moist appearance on the right ear canal he is not very symptomatic so I do not know how easy it will be for him to identify improvement or lack thereof.  I have also put him on our list to call him in for an audiogram as soon as we have audiologic services available.

## 2023-05-09 ENCOUNTER — TELEPHONE (OUTPATIENT)
Dept: FAMILY MEDICINE | Facility: CLINIC | Age: 73
End: 2023-05-09
Payer: MEDICARE

## 2023-05-09 NOTE — TELEPHONE ENCOUNTER
----- Message from Lisa Malachi sent at 5/9/2023  3:27 PM CDT -----  Regarding: Needs sooner appt  Type:  Sooner Appointment Request    Caller is requesting a sooner appointment.  Caller declined first available appointment listed below.  Caller will not accept being placed on the waitlist and is requesting a message be sent to doctor.    Name of Caller:  KAREN  When is the first available appointment?  5/16  Symptoms:  Pt has pain developing all across his torso he is getting concerned and would like to be seen as soon as possible please kasia Regalado Call Back Number:  [phone]    Additional Information:         None

## 2023-05-09 NOTE — TELEPHONE ENCOUNTER
Spoke with pt. Informed pt no sooner appt is available at this time. Pt scheduled with Lavonne on 5.16.23 to discuss torso pain. Pt scheduled to est care with Dr. Riddle in June. Pt voiced understanding

## 2023-06-07 ENCOUNTER — OFFICE VISIT (OUTPATIENT)
Dept: FAMILY MEDICINE | Facility: CLINIC | Age: 73
End: 2023-06-07
Payer: MEDICARE

## 2023-06-07 ENCOUNTER — LAB VISIT (OUTPATIENT)
Dept: LAB | Facility: HOSPITAL | Age: 73
End: 2023-06-07
Attending: FAMILY MEDICINE
Payer: MEDICARE

## 2023-06-07 VITALS
HEART RATE: 58 BPM | SYSTOLIC BLOOD PRESSURE: 108 MMHG | WEIGHT: 225.81 LBS | HEIGHT: 68 IN | TEMPERATURE: 98 F | OXYGEN SATURATION: 93 % | BODY MASS INDEX: 34.22 KG/M2 | RESPIRATION RATE: 17 BRPM | DIASTOLIC BLOOD PRESSURE: 68 MMHG

## 2023-06-07 DIAGNOSIS — N52.9 ERECTILE DYSFUNCTION, UNSPECIFIED ERECTILE DYSFUNCTION TYPE: ICD-10-CM

## 2023-06-07 DIAGNOSIS — Z80.42 FAMILY HISTORY OF PROSTATE CANCER: ICD-10-CM

## 2023-06-07 DIAGNOSIS — D64.9 NORMOCYTIC ANEMIA: ICD-10-CM

## 2023-06-07 DIAGNOSIS — Z12.5 SCREENING FOR PROSTATE CANCER: ICD-10-CM

## 2023-06-07 DIAGNOSIS — Z76.89 ENCOUNTER TO ESTABLISH CARE WITH NEW DOCTOR: ICD-10-CM

## 2023-06-07 DIAGNOSIS — E55.9 VITAMIN D DEFICIENCY: ICD-10-CM

## 2023-06-07 DIAGNOSIS — Z86.73 HISTORY OF CVA (CEREBROVASCULAR ACCIDENT): ICD-10-CM

## 2023-06-07 DIAGNOSIS — I10 ESSENTIAL HYPERTENSION: ICD-10-CM

## 2023-06-07 DIAGNOSIS — I10 ESSENTIAL HYPERTENSION: Primary | ICD-10-CM

## 2023-06-07 DIAGNOSIS — D69.6 THROMBOCYTOPENIA: ICD-10-CM

## 2023-06-07 DIAGNOSIS — Z79.899 OTHER LONG TERM (CURRENT) DRUG THERAPY: ICD-10-CM

## 2023-06-07 DIAGNOSIS — F41.9 ANXIETY: ICD-10-CM

## 2023-06-07 DIAGNOSIS — Z84.0 FAMILY HISTORY OF LUPUS ERYTHEMATOSUS: ICD-10-CM

## 2023-06-07 DIAGNOSIS — R53.82 CHRONIC FATIGUE: ICD-10-CM

## 2023-06-07 DIAGNOSIS — K21.9 GASTROESOPHAGEAL REFLUX DISEASE, UNSPECIFIED WHETHER ESOPHAGITIS PRESENT: ICD-10-CM

## 2023-06-07 DIAGNOSIS — N18.31 STAGE 3A CHRONIC KIDNEY DISEASE: ICD-10-CM

## 2023-06-07 LAB
25(OH)D3+25(OH)D2 SERPL-MCNC: 45 NG/ML (ref 30–96)
ALBUMIN SERPL BCP-MCNC: 4.1 G/DL (ref 3.5–5.2)
ALP SERPL-CCNC: 52 U/L (ref 55–135)
ALT SERPL W/O P-5'-P-CCNC: 18 U/L (ref 10–44)
ANION GAP SERPL CALC-SCNC: 12 MMOL/L (ref 8–16)
AST SERPL-CCNC: 20 U/L (ref 10–40)
BASOPHILS # BLD AUTO: 0.02 K/UL (ref 0–0.2)
BASOPHILS NFR BLD: 0.4 % (ref 0–1.9)
BILIRUB SERPL-MCNC: 1.5 MG/DL (ref 0.1–1)
BUN SERPL-MCNC: 14 MG/DL (ref 8–23)
CALCIUM SERPL-MCNC: 9.5 MG/DL (ref 8.7–10.5)
CHLORIDE SERPL-SCNC: 108 MMOL/L (ref 95–110)
CO2 SERPL-SCNC: 23 MMOL/L (ref 23–29)
COMPLEXED PSA SERPL-MCNC: 1.4 NG/ML (ref 0–4)
CREAT SERPL-MCNC: 1.5 MG/DL (ref 0.5–1.4)
DIFFERENTIAL METHOD: ABNORMAL
EOSINOPHIL # BLD AUTO: 0 K/UL (ref 0–0.5)
EOSINOPHIL NFR BLD: 0 % (ref 0–8)
ERYTHROCYTE [DISTWIDTH] IN BLOOD BY AUTOMATED COUNT: 14.8 % (ref 11.5–14.5)
EST. GFR  (NO RACE VARIABLE): 48.9 ML/MIN/1.73 M^2
FERRITIN SERPL-MCNC: 103 NG/ML (ref 20–300)
GLUCOSE SERPL-MCNC: 103 MG/DL (ref 70–110)
HCT VFR BLD AUTO: 41.3 % (ref 40–54)
HGB BLD-MCNC: 14.2 G/DL (ref 14–18)
IMM GRANULOCYTES # BLD AUTO: 0.02 K/UL (ref 0–0.04)
IMM GRANULOCYTES NFR BLD AUTO: 0.4 % (ref 0–0.5)
IRON SERPL-MCNC: 80 UG/DL (ref 45–160)
LYMPHOCYTES # BLD AUTO: 0.9 K/UL (ref 1–4.8)
LYMPHOCYTES NFR BLD: 17.1 % (ref 18–48)
MCH RBC QN AUTO: 29.6 PG (ref 27–31)
MCHC RBC AUTO-ENTMCNC: 34.4 G/DL (ref 32–36)
MCV RBC AUTO: 86 FL (ref 82–98)
MONOCYTES # BLD AUTO: 0.4 K/UL (ref 0.3–1)
MONOCYTES NFR BLD: 7.3 % (ref 4–15)
NEUTROPHILS # BLD AUTO: 3.9 K/UL (ref 1.8–7.7)
NEUTROPHILS NFR BLD: 74.8 % (ref 38–73)
NRBC BLD-RTO: 0 /100 WBC
PLATELET # BLD AUTO: 120 K/UL (ref 150–450)
PMV BLD AUTO: 11.1 FL (ref 9.2–12.9)
POTASSIUM SERPL-SCNC: 3.9 MMOL/L (ref 3.5–5.1)
PROT SERPL-MCNC: 6.8 G/DL (ref 6–8.4)
RBC # BLD AUTO: 4.8 M/UL (ref 4.6–6.2)
SATURATED IRON: 21 % (ref 20–50)
SODIUM SERPL-SCNC: 143 MMOL/L (ref 136–145)
T4 FREE SERPL-MCNC: 0.91 NG/DL (ref 0.71–1.51)
TESTOST SERPL-MCNC: 660 NG/DL (ref 304–1227)
TOTAL IRON BINDING CAPACITY: 377 UG/DL (ref 250–450)
TRANSFERRIN SERPL-MCNC: 255 MG/DL (ref 200–375)
TSH SERPL DL<=0.005 MIU/L-ACNC: 4.55 UIU/ML (ref 0.4–4)
VIT B12 SERPL-MCNC: 926 PG/ML (ref 210–950)
WBC # BLD AUTO: 5.2 K/UL (ref 3.9–12.7)

## 2023-06-07 PROCEDURE — 3074F SYST BP LT 130 MM HG: CPT | Mod: CPTII,S$GLB,, | Performed by: FAMILY MEDICINE

## 2023-06-07 PROCEDURE — 3078F PR MOST RECENT DIASTOLIC BLOOD PRESSURE < 80 MM HG: ICD-10-PCS | Mod: CPTII,S$GLB,, | Performed by: FAMILY MEDICINE

## 2023-06-07 PROCEDURE — 1160F RVW MEDS BY RX/DR IN RCRD: CPT | Mod: CPTII,S$GLB,, | Performed by: FAMILY MEDICINE

## 2023-06-07 PROCEDURE — 99204 PR OFFICE/OUTPT VISIT, NEW, LEVL IV, 45-59 MIN: ICD-10-PCS | Mod: S$GLB,,, | Performed by: FAMILY MEDICINE

## 2023-06-07 PROCEDURE — 84403 ASSAY OF TOTAL TESTOSTERONE: CPT | Performed by: FAMILY MEDICINE

## 2023-06-07 PROCEDURE — 3008F PR BODY MASS INDEX (BMI) DOCUMENTED: ICD-10-PCS | Mod: CPTII,S$GLB,, | Performed by: FAMILY MEDICINE

## 2023-06-07 PROCEDURE — 1160F PR REVIEW ALL MEDS BY PRESCRIBER/CLIN PHARMACIST DOCUMENTED: ICD-10-PCS | Mod: CPTII,S$GLB,, | Performed by: FAMILY MEDICINE

## 2023-06-07 PROCEDURE — 36415 COLL VENOUS BLD VENIPUNCTURE: CPT | Performed by: FAMILY MEDICINE

## 2023-06-07 PROCEDURE — 84466 ASSAY OF TRANSFERRIN: CPT | Performed by: FAMILY MEDICINE

## 2023-06-07 PROCEDURE — 99999 PR PBB SHADOW E&M-EST. PATIENT-LVL IV: ICD-10-PCS | Mod: PBBFAC,,, | Performed by: FAMILY MEDICINE

## 2023-06-07 PROCEDURE — 86039 ANTINUCLEAR ANTIBODIES (ANA): CPT | Performed by: FAMILY MEDICINE

## 2023-06-07 PROCEDURE — 85025 COMPLETE CBC W/AUTO DIFF WBC: CPT | Performed by: FAMILY MEDICINE

## 2023-06-07 PROCEDURE — 86235 NUCLEAR ANTIGEN ANTIBODY: CPT | Mod: 59 | Performed by: FAMILY MEDICINE

## 2023-06-07 PROCEDURE — 82607 VITAMIN B-12: CPT | Performed by: FAMILY MEDICINE

## 2023-06-07 PROCEDURE — 80053 COMPREHEN METABOLIC PANEL: CPT | Performed by: FAMILY MEDICINE

## 2023-06-07 PROCEDURE — 3074F PR MOST RECENT SYSTOLIC BLOOD PRESSURE < 130 MM HG: ICD-10-PCS | Mod: CPTII,S$GLB,, | Performed by: FAMILY MEDICINE

## 2023-06-07 PROCEDURE — 1126F AMNT PAIN NOTED NONE PRSNT: CPT | Mod: CPTII,S$GLB,, | Performed by: FAMILY MEDICINE

## 2023-06-07 PROCEDURE — 1126F PR PAIN SEVERITY QUANTIFIED, NO PAIN PRESENT: ICD-10-PCS | Mod: CPTII,S$GLB,, | Performed by: FAMILY MEDICINE

## 2023-06-07 PROCEDURE — 3008F BODY MASS INDEX DOCD: CPT | Mod: CPTII,S$GLB,, | Performed by: FAMILY MEDICINE

## 2023-06-07 PROCEDURE — 3288F FALL RISK ASSESSMENT DOCD: CPT | Mod: CPTII,S$GLB,, | Performed by: FAMILY MEDICINE

## 2023-06-07 PROCEDURE — 3078F DIAST BP <80 MM HG: CPT | Mod: CPTII,S$GLB,, | Performed by: FAMILY MEDICINE

## 2023-06-07 PROCEDURE — 84439 ASSAY OF FREE THYROXINE: CPT | Performed by: FAMILY MEDICINE

## 2023-06-07 PROCEDURE — 1101F PR PT FALLS ASSESS DOC 0-1 FALLS W/OUT INJ PAST YR: ICD-10-PCS | Mod: CPTII,S$GLB,, | Performed by: FAMILY MEDICINE

## 2023-06-07 PROCEDURE — 1159F PR MEDICATION LIST DOCUMENTED IN MEDICAL RECORD: ICD-10-PCS | Mod: CPTII,S$GLB,, | Performed by: FAMILY MEDICINE

## 2023-06-07 PROCEDURE — 82728 ASSAY OF FERRITIN: CPT | Performed by: FAMILY MEDICINE

## 2023-06-07 PROCEDURE — 1101F PT FALLS ASSESS-DOCD LE1/YR: CPT | Mod: CPTII,S$GLB,, | Performed by: FAMILY MEDICINE

## 2023-06-07 PROCEDURE — 99999 PR PBB SHADOW E&M-EST. PATIENT-LVL IV: CPT | Mod: PBBFAC,,, | Performed by: FAMILY MEDICINE

## 2023-06-07 PROCEDURE — 86038 ANTINUCLEAR ANTIBODIES: CPT | Performed by: FAMILY MEDICINE

## 2023-06-07 PROCEDURE — 99204 OFFICE O/P NEW MOD 45 MIN: CPT | Mod: S$GLB,,, | Performed by: FAMILY MEDICINE

## 2023-06-07 PROCEDURE — 3288F PR FALLS RISK ASSESSMENT DOCUMENTED: ICD-10-PCS | Mod: CPTII,S$GLB,, | Performed by: FAMILY MEDICINE

## 2023-06-07 PROCEDURE — 84443 ASSAY THYROID STIM HORMONE: CPT | Performed by: FAMILY MEDICINE

## 2023-06-07 PROCEDURE — 82306 VITAMIN D 25 HYDROXY: CPT | Performed by: FAMILY MEDICINE

## 2023-06-07 PROCEDURE — 1159F MED LIST DOCD IN RCRD: CPT | Mod: CPTII,S$GLB,, | Performed by: FAMILY MEDICINE

## 2023-06-07 PROCEDURE — 84153 ASSAY OF PSA TOTAL: CPT | Performed by: FAMILY MEDICINE

## 2023-06-07 RX ORDER — ESZOPICLONE 3 MG/1
3 TABLET, FILM COATED ORAL NIGHTLY PRN
COMMUNITY
Start: 2023-04-10 | End: 2023-10-09 | Stop reason: SDUPTHER

## 2023-06-07 RX ORDER — VALACYCLOVIR HYDROCHLORIDE 1 G/1
1000 TABLET, FILM COATED ORAL EVERY 8 HOURS
COMMUNITY
Start: 2023-05-20 | End: 2024-02-16 | Stop reason: SDUPTHER

## 2023-06-07 RX ORDER — DAPSONE 50 MG/G
GEL TOPICAL 2 TIMES DAILY
COMMUNITY

## 2023-06-07 NOTE — PROGRESS NOTES
[facility] - Clinic Note    Subjective      Mr. Ortega is a 72 y.o. male who presents to clinic to establish care.     Previously seen by Dr. Richard.     HTN: amlodipine and lisinopril    Anxiety: takes xanax prn and buspar  Insomnia: takes trazodone and seroquel. Lunesta prn    History of a stroke: takes crestor    Gerd: protonix    Reports chronic fatigue. Present for more than 3 months.   Started gradually but has gotten progressively worse  There is a history of lupus on his chart but state that he is unaware of this.    [facility] Betzy has a past medical history of Anxiety, Cataract, Depression, Disorder of kidney and ureter, Hypertension, Lupus, and Stroke (04/2017).   PSXH Betzy has a past surgical history that includes cataract surgery (Left); cervical fusion cadivor bone; carpel tunnel hand (Bilateral); and Knee surgery (Left).    Betzy's family history includes Cancer in his mother; Pancreatic cancer in his brother; Prostate cancer in his father.    Betzy reports that he has never smoked. His smokeless tobacco use includes snuff. He reports that he does not drink alcohol and does not use drugs.   JACKELYN Bo is allergic to abilify [aripiprazole], irbesartan-hydrochlorothiazide, and losartan.   ANITA Bo has a current medication list which includes the following prescription(s): alprazolam, amlodipine, aspirin, buspirone, calcium carbonate, cholecalciferol (vitamin d3), dapsone, dapsone, eszopiclone, pantoprazole, quetiapine, rosuvastatin, tamsulosin, trazodone, valacyclovir, cetirizine-pseudoephedrine, and lisinopril.     Review of Systems   Constitutional:  Positive for fatigue. Negative for activity change, appetite change, chills and fever.   Eyes:  Negative for visual disturbance.   Respiratory:  Negative for cough and shortness of breath.    Cardiovascular:  Negative for chest pain, palpitations and leg swelling.   Gastrointestinal:  Negative for abdominal pain, nausea and vomiting.   Skin:  " Negative for wound.   Neurological:  Negative for dizziness and headaches.   Psychiatric/Behavioral:  Negative for confusion.    Objective     /68 (BP Location: Left arm, Patient Position: Sitting, BP Method: Large (Manual))   Pulse (!) 58   Temp 98.1 °F (36.7 °C) (Oral)   Resp 17   Ht 5' 8" (1.727 m)   Wt 102.4 kg (225 lb 12.8 oz)   SpO2 (!) 93%   BMI 34.33 kg/m²     Physical Exam   Constitutional: normal appearance. He appears obese.  Non-toxic appearance. No distress. He does not appear ill.   HENT:   Head: Normocephalic and atraumatic.   Eyes: Right eye exhibits no discharge. Left eye exhibits no discharge.   Cardiovascular: Normal rate, regular rhythm, normal heart sounds and normal pulses. Exam reveals no gallop and no friction rub.   No murmur heard.Pulmonary:      Effort: Pulmonary effort is normal. No respiratory distress.      Breath sounds: Normal breath sounds. No wheezing, rhonchi or rales.     Abdominal: Normal appearance.   Musculoskeletal:      Right lower leg: No edema.      Left lower leg: No edema.   Lymphadenopathy:     He has no cervical adenopathy.   Neurological: He is alert.   Skin: Skin is warm and dry. Capillary refill takes less than 2 seconds. He is not diaphoretic.   Psychiatric: His behavior is normal. Mood, judgment and thought content normal.   Vitals reviewed.   Assessment/Plan     Betzy was seen today for establish care and fatigue.    Diagnoses and all orders for this visit:  -New patient and new problem to me    Essential hypertension  -     CBC auto differential; Future  -     Comprehensive metabolic panel; Future  -     TSH; Future    Anxiety    Erectile dysfunction, unspecified erectile dysfunction type  -     TSH; Future  -     Testosterone; Future    History of CVA (cerebrovascular accident)    Screening for prostate cancer  -     PSA, Screening; Future    Family history of prostate cancer  -     PSA, Screening; Future    Vitamin D deficiency  -     Vitamin D " 25-Hydroxy; Future    Other long term (current) drug therapy  -     Vitamin B12; Future    Family history of lupus erythematosus  -     ABELARDO; Future    Normocytic anemia  -     CBC auto differential; Future  -     Vitamin B12; Future  -     Iron and TIBC; Future  -     Ferritin; Future    Gastroesophageal reflux disease, unspecified whether esophagitis present    Encounter to establish care with new doctor    Thrombocytopenia    Stage 3a chronic kidney disease  -     Comprehensive metabolic panel; Future    Chronic fatigue  -     CBC auto differential; Future  -     Comprehensive metabolic panel; Future  -     TSH; Future  -     Vitamin B12; Future  -     Testosterone; Future  -     Iron and TIBC; Future  -     Ferritin; Future  -     Vitamin D 25-Hydroxy; Future  -     ABELARDO; Future      Obtain labs and adjust regimen as indicated      Follow up in about 4 weeks (around 7/5/2023), or if symptoms worsen or fail to improve.    Future Appointments   Date Time Provider Department Center   7/12/2023  9:20 AM Jass Reyes MD [facility]       Jass Reyes MD  Family Medicine  [facility]

## 2023-06-08 LAB
ANA PATTERN 1: NORMAL
ANA SER QL IF: POSITIVE
ANA TITR SER IF: NORMAL {TITER}

## 2023-06-12 ENCOUNTER — TELEPHONE (OUTPATIENT)
Dept: FAMILY MEDICINE | Facility: CLINIC | Age: 73
End: 2023-06-12
Payer: MEDICARE

## 2023-06-12 NOTE — TELEPHONE ENCOUNTER
----- Message from Chiqui Casarez sent at 6/12/2023  8:41 AM CDT -----  Contact: self  Type:  Test Results    Who Called:    Name of Test (Lab/Mammo/Etc):  Labs  Date of Test:  6/7  Ordering Provider:  Dr. Reyes  Where the test was performed:  [facility]  Would the patient rather a call back or a response via [facility]? call  Best Call Back Number:  [phone]    Additional Information:  Please call to advise/consult... Thank you

## 2023-06-13 LAB
ANTI SM ANTIBODY: 0.4 RATIO (ref 0–0.99)
ANTI SM/RNP ANTIBODY: 0.23 RATIO (ref 0–0.99)
ANTI-SM INTERPRETATION: NEGATIVE
ANTI-SM/RNP INTERPRETATION: NEGATIVE
ANTI-SSA ANTIBODY: 0.22 RATIO (ref 0–0.99)
ANTI-SSA INTERPRETATION: NEGATIVE
ANTI-SSB ANTIBODY: 0.15 RATIO (ref 0–0.99)
ANTI-SSB INTERPRETATION: NEGATIVE
DSDNA AB SER-ACNC: NORMAL [IU]/ML

## 2023-06-15 ENCOUNTER — TELEPHONE (OUTPATIENT)
Dept: FAMILY MEDICINE | Facility: CLINIC | Age: 73
End: 2023-06-15
Payer: MEDICARE

## 2023-06-15 DIAGNOSIS — Z11.59 NEED FOR HEPATITIS C SCREENING TEST: ICD-10-CM

## 2023-06-15 NOTE — TELEPHONE ENCOUNTER
----- Message from Joshua Murphy sent at 6/15/2023 12:09 PM CDT -----  Type: Needs Medical Advice  Who Called:  Pt  Best Call Back Number: [phone]  Additional Information: Pt is waiting on a call bk to discuss lab results, pl call bk to advise thanks

## 2023-06-22 ENCOUNTER — PATIENT MESSAGE (OUTPATIENT)
Dept: FAMILY MEDICINE | Facility: CLINIC | Age: 73
End: 2023-06-22
Payer: MEDICARE

## 2023-06-22 PROBLEM — D69.6 THROMBOCYTOPENIA: Status: ACTIVE | Noted: 2023-06-22

## 2023-06-22 PROBLEM — N18.31 STAGE 3A CHRONIC KIDNEY DISEASE: Status: ACTIVE | Noted: 2023-06-22

## 2023-07-12 ENCOUNTER — LAB VISIT (OUTPATIENT)
Dept: LAB | Facility: HOSPITAL | Age: 73
End: 2023-07-12
Attending: FAMILY MEDICINE
Payer: MEDICARE

## 2023-07-12 ENCOUNTER — OFFICE VISIT (OUTPATIENT)
Dept: FAMILY MEDICINE | Facility: CLINIC | Age: 73
End: 2023-07-12
Payer: MEDICARE

## 2023-07-12 VITALS
HEIGHT: 68 IN | HEART RATE: 67 BPM | DIASTOLIC BLOOD PRESSURE: 66 MMHG | WEIGHT: 223.19 LBS | RESPIRATION RATE: 16 BRPM | BODY MASS INDEX: 33.83 KG/M2 | SYSTOLIC BLOOD PRESSURE: 124 MMHG | TEMPERATURE: 98 F | OXYGEN SATURATION: 96 %

## 2023-07-12 DIAGNOSIS — R23.3 PETECHIAE: ICD-10-CM

## 2023-07-12 DIAGNOSIS — R94.6 ABNORMAL THYROID FUNCTION TEST: Primary | ICD-10-CM

## 2023-07-12 DIAGNOSIS — I10 ESSENTIAL HYPERTENSION: ICD-10-CM

## 2023-07-12 DIAGNOSIS — D69.6 THROMBOCYTOPENIA: ICD-10-CM

## 2023-07-12 DIAGNOSIS — R94.6 ABNORMAL THYROID FUNCTION TEST: ICD-10-CM

## 2023-07-12 DIAGNOSIS — R76.8 POSITIVE ANA (ANTINUCLEAR ANTIBODY): ICD-10-CM

## 2023-07-12 DIAGNOSIS — R53.1 WEAKNESS: ICD-10-CM

## 2023-07-12 LAB — TSH SERPL DL<=0.005 MIU/L-ACNC: 3.52 UIU/ML (ref 0.4–4)

## 2023-07-12 PROCEDURE — 3078F DIAST BP <80 MM HG: CPT | Mod: CPTII,S$GLB,, | Performed by: FAMILY MEDICINE

## 2023-07-12 PROCEDURE — 3288F FALL RISK ASSESSMENT DOCD: CPT | Mod: CPTII,S$GLB,, | Performed by: FAMILY MEDICINE

## 2023-07-12 PROCEDURE — 36415 COLL VENOUS BLD VENIPUNCTURE: CPT | Performed by: FAMILY MEDICINE

## 2023-07-12 PROCEDURE — 99999 PR PBB SHADOW E&M-EST. PATIENT-LVL IV: ICD-10-PCS | Mod: PBBFAC,,, | Performed by: FAMILY MEDICINE

## 2023-07-12 PROCEDURE — 84443 ASSAY THYROID STIM HORMONE: CPT | Performed by: FAMILY MEDICINE

## 2023-07-12 PROCEDURE — 3008F BODY MASS INDEX DOCD: CPT | Mod: CPTII,S$GLB,, | Performed by: FAMILY MEDICINE

## 2023-07-12 PROCEDURE — 1159F MED LIST DOCD IN RCRD: CPT | Mod: CPTII,S$GLB,, | Performed by: FAMILY MEDICINE

## 2023-07-12 PROCEDURE — 1126F AMNT PAIN NOTED NONE PRSNT: CPT | Mod: CPTII,S$GLB,, | Performed by: FAMILY MEDICINE

## 2023-07-12 PROCEDURE — 99999 PR PBB SHADOW E&M-EST. PATIENT-LVL IV: CPT | Mod: PBBFAC,,, | Performed by: FAMILY MEDICINE

## 2023-07-12 PROCEDURE — 1159F PR MEDICATION LIST DOCUMENTED IN MEDICAL RECORD: ICD-10-PCS | Mod: CPTII,S$GLB,, | Performed by: FAMILY MEDICINE

## 2023-07-12 PROCEDURE — 3078F PR MOST RECENT DIASTOLIC BLOOD PRESSURE < 80 MM HG: ICD-10-PCS | Mod: CPTII,S$GLB,, | Performed by: FAMILY MEDICINE

## 2023-07-12 PROCEDURE — 3074F SYST BP LT 130 MM HG: CPT | Mod: CPTII,S$GLB,, | Performed by: FAMILY MEDICINE

## 2023-07-12 PROCEDURE — 1101F PR PT FALLS ASSESS DOC 0-1 FALLS W/OUT INJ PAST YR: ICD-10-PCS | Mod: CPTII,S$GLB,, | Performed by: FAMILY MEDICINE

## 2023-07-12 PROCEDURE — 99214 PR OFFICE/OUTPT VISIT, EST, LEVL IV, 30-39 MIN: ICD-10-PCS | Mod: S$GLB,,, | Performed by: FAMILY MEDICINE

## 2023-07-12 PROCEDURE — 3288F PR FALLS RISK ASSESSMENT DOCUMENTED: ICD-10-PCS | Mod: CPTII,S$GLB,, | Performed by: FAMILY MEDICINE

## 2023-07-12 PROCEDURE — 1101F PT FALLS ASSESS-DOCD LE1/YR: CPT | Mod: CPTII,S$GLB,, | Performed by: FAMILY MEDICINE

## 2023-07-12 PROCEDURE — 3074F PR MOST RECENT SYSTOLIC BLOOD PRESSURE < 130 MM HG: ICD-10-PCS | Mod: CPTII,S$GLB,, | Performed by: FAMILY MEDICINE

## 2023-07-12 PROCEDURE — 3008F PR BODY MASS INDEX (BMI) DOCUMENTED: ICD-10-PCS | Mod: CPTII,S$GLB,, | Performed by: FAMILY MEDICINE

## 2023-07-12 PROCEDURE — 1126F PR PAIN SEVERITY QUANTIFIED, NO PAIN PRESENT: ICD-10-PCS | Mod: CPTII,S$GLB,, | Performed by: FAMILY MEDICINE

## 2023-07-12 PROCEDURE — 99214 OFFICE O/P EST MOD 30 MIN: CPT | Mod: S$GLB,,, | Performed by: FAMILY MEDICINE

## 2023-07-12 RX ORDER — AMLODIPINE BESYLATE 2.5 MG/1
2.5 TABLET ORAL DAILY
Qty: 30 TABLET | Refills: 11 | Status: SHIPPED | OUTPATIENT
Start: 2023-07-12 | End: 2023-10-16 | Stop reason: SDUPTHER

## 2023-07-12 NOTE — PROGRESS NOTES
[facility] - Clinic Note    Subjective      Mr. Ortega is a 72 y.o. male who presents to clinic for a follow up.     Patient was seen about 1 month ago to establish care. He had complaints of chronic fatigue. Labs were obtain which revealed slightly elevated TSH with normal T4 and positive ABELARDO with normal reflex. Chart says a h/o lupus but states that he was never told this and never treated for lupus. Never seen a rheumatologist.  States that the fatigue is more described as weakness which has improved slightly since his last visit. He has a history of dysautonomia and he has decreased his amlodipine to half a tablet daily which has helped but not back at his baseline.     He reports a rash on his lower legs that itches.   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 Has been on this for many years.   Thrombocytopenia present on past labs for the past few years. Recently at 120.     PMH Betzy has a past medical history of Anxiety, Cataract, Depression, Disorder of kidney and ureter, Hypertension, Lupus, and Stroke (04/2017).   PSX Betzy has a past surgical history that includes cataract surgery (Left); cervical fusion cadivor bone; carpel tunnel hand (Bilateral); and Knee surgery (Left).    Betzy's family history includes Cancer in his mother; Pancreatic cancer in his brother; Prostate cancer in his father.   FIORELLA Bo reports that he has never smoked. His smokeless tobacco use includes snuff. He reports that he does not drink alcohol and does not use drugs.   JACKELYN Bo is allergic to abilify [aripiprazole], irbesartan-hydrochlorothiazide, and losartan.   ANITA Bo has a current medication list which includes the following prescription(s): alprazolam, aspirin, buspirone, calcium carbonate, cholecalciferol (vitamin d3), dapsone, dapsone, eszopiclone, pantoprazole, rosuvastatin, tamsulosin, trazodone, valacyclovir, amlodipine, cetirizine-pseudoephedrine, and quetiapine.     Review of  "Systems   Constitutional:  Negative for activity change, appetite change, chills, fatigue and fever.   Eyes:  Negative for visual disturbance.   Respiratory:  Negative for cough and shortness of breath.    Cardiovascular:  Negative for chest pain, palpitations and leg swelling.   Gastrointestinal:  Negative for abdominal pain, nausea and vomiting.   Skin:  Positive for rash. Negative for wound.   Neurological:  Positive for weakness. Negative for dizziness and headaches.   Psychiatric/Behavioral:  Negative for confusion.    Objective     /66 (BP Location: Left arm, Patient Position: Sitting, BP Method: Large (Manual))   Pulse 67   Temp 97.6 °F (36.4 °C) (Temporal)   Resp 16   Ht 5' 8" (1.727 m)   Wt 101.2 kg (223 lb 3.2 oz)   SpO2 96%   BMI 33.94 kg/m²     Physical Exam   Constitutional: normal appearance. He appears obese.  Non-toxic appearance. No distress. He does not appear ill.   HENT:   Head: Normocephalic and atraumatic.   Eyes: Right eye exhibits no discharge. Left eye exhibits no discharge.   Cardiovascular: Normal rate, regular rhythm, normal heart sounds and normal pulses. Exam reveals no gallop and no friction rub.   No murmur heard.Pulmonary:      Effort: Pulmonary effort is normal. No respiratory distress.      Breath sounds: Normal breath sounds. No wheezing, rhonchi or rales.     Abdominal: Normal appearance.   Musculoskeletal:      Right lower leg: No edema.      Left lower leg: No edema.   Lymphadenopathy:     He has no cervical adenopathy.   Neurological: He is alert.   Skin: Skin is warm and dry. Capillary refill takes less than 2 seconds. He is not diaphoretic.   Psychiatric: His behavior is normal. Mood, judgment and thought content normal.   Vitals reviewed.   Assessment/Plan     Betzy was seen today for follow-up.    Diagnoses and all orders for this visit:    Abnormal thyroid function test  -     TSH; Future  - Recheck TSH and treat as indicated.     Essential hypertension  -   "   amLODIPine (NORVASC) 2.5 MG tablet; Take 1 tablet (2.5 mg total) by mouth once daily.  - Recommended stopping amlodipine for 2 weeks and monitor BP to ensure it is not elevated and see if his weakness improves. Informed if BP >140/90 to restart amlodipine 2.5mg    Petechiae  -likely due to  thrombocytopenia. Refer to [facility]. Number given to patient    Positive ABELARDO (antinuclear antibody)  -reflex normal. Consider referral to rheum after derm visit    Weakness  -adjust amlodipine    Thrombocytopenia  -recommended stopping protonix as this is an adverse effects but patient does not want to. States that he needs thi medication. Continue to monitor.      Follow up in about 6 weeks (around 8/23/2023), or if symptoms worsen or fail to improve.    Future Appointments   Date Time Provider Department Center   9/13/2023  3:00 PM Jass Reyes MD [facility]       Jass Reyes MD  Family Medicine  [facility]

## 2023-09-05 DIAGNOSIS — F41.9 ANXIETY: Primary | ICD-10-CM

## 2023-09-05 DIAGNOSIS — Z86.73 HISTORY OF CVA (CEREBROVASCULAR ACCIDENT): ICD-10-CM

## 2023-09-05 DIAGNOSIS — G47.00 INSOMNIA, UNSPECIFIED TYPE: ICD-10-CM

## 2023-09-05 RX ORDER — BUSPIRONE HYDROCHLORIDE 15 MG/1
15 TABLET ORAL 2 TIMES DAILY
Qty: 180 TABLET | Refills: 3 | Status: SHIPPED | OUTPATIENT
Start: 2023-09-05 | End: 2024-02-01

## 2023-09-05 RX ORDER — TRAZODONE HYDROCHLORIDE 100 MG/1
200 TABLET ORAL DAILY
Qty: 180 TABLET | Refills: 3 | Status: SHIPPED | OUTPATIENT
Start: 2023-09-05

## 2023-09-05 NOTE — TELEPHONE ENCOUNTER
----- Message from Eddiedanilo Pedrogurpreet He sent at 9/5/2023 11:44 AM CDT -----  Type:  RX Refill Request    Who Called:  pt   Refill or New Rx:  refill   RX Name and Strength:  traZODone (DESYREL) 100 MG tablet, rosuvastatin 5 mg CpSP, ALPRAZolam (XANAX) 0.5 MG table, tamsulosin (FLOMAX) 0.4 mg Cap, busPIRone (BUSPAR) 15 MG tablet  How is the patient currently taking it? (ex. 1XDay):  as directed   Is this a 30 day or 90 day RX:  90 day   Preferred Pharmacy with phone number:    [id number] [place] [address] AT [facility] OF [facility] [address] [address]  Phone: [phone] Fax: [phone]  Local or Mail Order:  local   Ordering Provider:  Was Dr. Braswell   Best Call Back Number:  [phone]  Additional Information:  pt needs refill sent asap please advise asap thanks!

## 2023-09-06 ENCOUNTER — TELEPHONE (OUTPATIENT)
Dept: FAMILY MEDICINE | Facility: CLINIC | Age: 73
End: 2023-09-06
Payer: MEDICARE

## 2023-09-06 NOTE — TELEPHONE ENCOUNTER
Attempted to contact Betzy Ortega to discuss  medication .    Unable to leave message on phone - no voicemail or mailbox full on [phone] (home).    Brie Zaragoza LPN

## 2023-09-06 NOTE — TELEPHONE ENCOUNTER
----- Message from Brie Zaragoza LPN sent at 9/5/2023  4:55 PM CDT -----    ----- Message -----  From: Margarita Mendez  Sent: 9/5/2023   4:54 PM CDT  To: Amy SQUIRES Staff    Type:  Needs Medical Advice    Who Called:  Pt    Pharmacy name and phone #:     [facility] #[id number] - [place] [address] AT [facility] [address]  [address] [address]  Phone: [phone] Fax: [phone]    Would the patient rather a call back or a response via [facility]?  Call back    Best Call Back Number:  [phone]    Additional Information:  Pt is calling to see why his ALPRAZolam (XANAX) 0.5 MG tablet was not filled and wants to speak with him. Please call back to advise. Thanks!

## 2023-09-13 ENCOUNTER — PATIENT MESSAGE (OUTPATIENT)
Dept: FAMILY MEDICINE | Facility: CLINIC | Age: 73
End: 2023-09-13
Payer: MEDICARE

## 2023-09-13 ENCOUNTER — OFFICE VISIT (OUTPATIENT)
Dept: FAMILY MEDICINE | Facility: CLINIC | Age: 73
End: 2023-09-13
Payer: MEDICARE

## 2023-09-13 VITALS
BODY MASS INDEX: 33.19 KG/M2 | HEART RATE: 68 BPM | SYSTOLIC BLOOD PRESSURE: 124 MMHG | DIASTOLIC BLOOD PRESSURE: 68 MMHG | OXYGEN SATURATION: 98 % | WEIGHT: 219 LBS | HEIGHT: 68 IN

## 2023-09-13 DIAGNOSIS — F51.01 PRIMARY INSOMNIA: Primary | ICD-10-CM

## 2023-09-13 DIAGNOSIS — I10 ESSENTIAL HYPERTENSION: ICD-10-CM

## 2023-09-13 DIAGNOSIS — R42 DIZZINESS: ICD-10-CM

## 2023-09-13 DIAGNOSIS — K21.9 GASTROESOPHAGEAL REFLUX DISEASE, UNSPECIFIED WHETHER ESOPHAGITIS PRESENT: ICD-10-CM

## 2023-09-13 DIAGNOSIS — Z86.73 HISTORY OF CVA (CEREBROVASCULAR ACCIDENT): ICD-10-CM

## 2023-09-13 DIAGNOSIS — F41.9 ANXIETY: ICD-10-CM

## 2023-09-13 PROCEDURE — 1126F AMNT PAIN NOTED NONE PRSNT: CPT | Mod: CPTII,S$GLB,, | Performed by: FAMILY MEDICINE

## 2023-09-13 PROCEDURE — 99999 PR PBB SHADOW E&M-EST. PATIENT-LVL IV: CPT | Mod: PBBFAC,,, | Performed by: FAMILY MEDICINE

## 2023-09-13 PROCEDURE — 3074F SYST BP LT 130 MM HG: CPT | Mod: CPTII,S$GLB,, | Performed by: FAMILY MEDICINE

## 2023-09-13 PROCEDURE — 3078F PR MOST RECENT DIASTOLIC BLOOD PRESSURE < 80 MM HG: ICD-10-PCS | Mod: CPTII,S$GLB,, | Performed by: FAMILY MEDICINE

## 2023-09-13 PROCEDURE — 3288F PR FALLS RISK ASSESSMENT DOCUMENTED: ICD-10-PCS | Mod: CPTII,S$GLB,, | Performed by: FAMILY MEDICINE

## 2023-09-13 PROCEDURE — 1126F PR PAIN SEVERITY QUANTIFIED, NO PAIN PRESENT: ICD-10-PCS | Mod: CPTII,S$GLB,, | Performed by: FAMILY MEDICINE

## 2023-09-13 PROCEDURE — 99214 OFFICE O/P EST MOD 30 MIN: CPT | Mod: S$GLB,,, | Performed by: FAMILY MEDICINE

## 2023-09-13 PROCEDURE — 99999 PR PBB SHADOW E&M-EST. PATIENT-LVL IV: ICD-10-PCS | Mod: PBBFAC,,, | Performed by: FAMILY MEDICINE

## 2023-09-13 PROCEDURE — 1101F PR PT FALLS ASSESS DOC 0-1 FALLS W/OUT INJ PAST YR: ICD-10-PCS | Mod: CPTII,S$GLB,, | Performed by: FAMILY MEDICINE

## 2023-09-13 PROCEDURE — 3008F BODY MASS INDEX DOCD: CPT | Mod: CPTII,S$GLB,, | Performed by: FAMILY MEDICINE

## 2023-09-13 PROCEDURE — 99214 PR OFFICE/OUTPT VISIT, EST, LEVL IV, 30-39 MIN: ICD-10-PCS | Mod: S$GLB,,, | Performed by: FAMILY MEDICINE

## 2023-09-13 PROCEDURE — 3074F PR MOST RECENT SYSTOLIC BLOOD PRESSURE < 130 MM HG: ICD-10-PCS | Mod: CPTII,S$GLB,, | Performed by: FAMILY MEDICINE

## 2023-09-13 PROCEDURE — 3288F FALL RISK ASSESSMENT DOCD: CPT | Mod: CPTII,S$GLB,, | Performed by: FAMILY MEDICINE

## 2023-09-13 PROCEDURE — 3078F DIAST BP <80 MM HG: CPT | Mod: CPTII,S$GLB,, | Performed by: FAMILY MEDICINE

## 2023-09-13 PROCEDURE — 1101F PT FALLS ASSESS-DOCD LE1/YR: CPT | Mod: CPTII,S$GLB,, | Performed by: FAMILY MEDICINE

## 2023-09-13 PROCEDURE — 3008F PR BODY MASS INDEX (BMI) DOCUMENTED: ICD-10-PCS | Mod: CPTII,S$GLB,, | Performed by: FAMILY MEDICINE

## 2023-09-13 RX ORDER — QUETIAPINE FUMARATE 100 MG/1
100 TABLET, FILM COATED ORAL NIGHTLY
Qty: 30 TABLET | Refills: 6 | Status: SHIPPED | OUTPATIENT
Start: 2023-09-13 | End: 2024-02-01

## 2023-09-13 RX ORDER — ATORVASTATIN CALCIUM 20 MG/1
20 TABLET, FILM COATED ORAL DAILY
Qty: 90 TABLET | Refills: 3 | OUTPATIENT
Start: 2023-09-13 | End: 2024-09-12

## 2023-09-13 RX ORDER — ALPRAZOLAM 0.5 MG/1
0.5 TABLET ORAL 2 TIMES DAILY PRN
Qty: 60 TABLET | Refills: 2 | Status: SHIPPED | OUTPATIENT
Start: 2023-09-13

## 2023-09-13 NOTE — PROGRESS NOTES
[facility] - Clinic Note    Subjective      Mr. Ortega is a 73 y.o. male who presents to clinic for a follow up.    HTN: amlodipine     Anxiety: takes xanax prn and buspar  Insomnia: takes trazodone and seroquel. Lunesta prn     History of a stroke: takes crestor     Gerd: protonix    PM Betzy has a past medical history of Anxiety, Cataract, Depression, Disorder of kidney and ureter, Hypertension, Lupus, and Stroke (04/2017).   PSXH Betzy has a past surgical history that includes cataract surgery (Left); cervical fusion cadivor bone; carpel tunnel hand (Bilateral); and Knee surgery (Left).    Betzy's family history includes Cancer in his mother; Pancreatic cancer in his brother; Prostate cancer in his father.    Betzy reports that he has never smoked. His smokeless tobacco use includes snuff. He reports that he does not drink alcohol and does not use drugs.   JACKELYN Bo is allergic to abilify [aripiprazole], irbesartan-hydrochlorothiazide, and losartan.   ANITA Bo has a current medication list which includes the following prescription(s): amlodipine, aspirin, buspirone, calcium carbonate, cetirizine-pseudoephedrine, cholecalciferol (vitamin d3), dapsone, dapsone, eszopiclone, pantoprazole, tamsulosin, trazodone, valacyclovir, alprazolam, azithromycin, dexamethasone, neomycin-polymyxin-dexamethasone, quetiapine, rosuvastatin, and rosuvastatin.     Review of Systems   Constitutional:  Negative for activity change, appetite change, chills, fatigue and fever.   Eyes:  Negative for visual disturbance.   Respiratory:  Negative for cough and shortness of breath.    Cardiovascular:  Negative for chest pain, palpitations and leg swelling.   Gastrointestinal:  Negative for abdominal pain, nausea and vomiting.   Skin:  Negative for wound.   Neurological:  Positive for dizziness. Negative for headaches.   Psychiatric/Behavioral:  Negative for confusion.      Objective     /68 (BP Location: Right arm,  "Patient Position: Sitting, BP Method: Medium (Manual))   Pulse 68   Ht 5' 8" (1.727 m)   Wt 99.3 kg (219 lb)   SpO2 98%   BMI 33.30 kg/m²     Physical Exam   Constitutional: normal appearance.  Non-toxic appearance. No distress. He does not appear ill.   HENT:   Head: Normocephalic and atraumatic.   Eyes: Right eye exhibits no discharge. Left eye exhibits no discharge.   Cardiovascular: Normal rate, regular rhythm, normal heart sounds and normal pulses. Exam reveals no gallop and no friction rub.   No murmur heard.Pulmonary:      Effort: Pulmonary effort is normal. No respiratory distress.      Breath sounds: Normal breath sounds. No wheezing, rhonchi or rales.     Abdominal: Normal appearance.   Musculoskeletal:      Right lower leg: No edema.      Left lower leg: No edema.   Lymphadenopathy:     He has no cervical adenopathy.   Neurological: He is alert.   Skin: Skin is warm and dry. Capillary refill takes less than 2 seconds. He is not diaphoretic.   Psychiatric: His behavior is normal. Mood, judgment and thought content normal.   Vitals reviewed.     Assessment/Plan     Betzy was seen today for follow-up.    Diagnoses and all orders for this visit:    Primary insomnia  -     QUEtiapine (SEROQUEL) 100 MG Tab; Take 1 tablet (100 mg total) by mouth every evening.    Anxiety  -     ALPRAZolam (XANAX) 0.5 MG tablet; Take 1 tablet (0.5 mg total) by mouth 2 (two) times daily as needed for Anxiety.    History of CVA (cerebrovascular accident)  -      rosuvastatin 5 mg CpSP; Take 5 mg by mouth once daily.    Dizziness  -     Ambulatory referral/consult to ENT; Future    Essential hypertension    Gastroesophageal reflux disease, unspecified whether esophagitis present        Follow up in about 6 months (around 3/13/2024).    Future Appointments   Date Time Provider Department Center   11/21/2023  2:00 PM Santa Montenegro MD [facility]       Jass Reyes MD  Family Medicine  [facility]

## 2023-09-14 ENCOUNTER — TELEPHONE (OUTPATIENT)
Dept: FAMILY MEDICINE | Facility: CLINIC | Age: 73
End: 2023-09-14
Payer: MEDICARE

## 2023-09-14 DIAGNOSIS — Z86.73 HISTORY OF CVA (CEREBROVASCULAR ACCIDENT): ICD-10-CM

## 2023-09-14 NOTE — TELEPHONE ENCOUNTER
Attempted to contact Betzy Ortega to discuss  concerns .    Unable to leave message on phone - no voicemail or mailbox full on [phone] (home).    Brie Zaragoza LPN

## 2023-09-14 NOTE — TELEPHONE ENCOUNTER
----- Message from Deidre Baum sent at 9/14/2023 10:34 AM CDT -----  Regarding: RX issues  Contact: pt  Type:  Needs Medical Advice    Who Called: pt    Would the patient rather a call back or a response via [facility]? Call back    Best Call Back Number:[phone]    Additional Information: sts he would like to speak to someone regarding medication issues he is having---please advise and thank you

## 2023-09-14 NOTE — TELEPHONE ENCOUNTER
----- Message from Deidrejimmy Baum sent at 9/14/2023 10:30 AM CDT -----  Regarding: refill  Contact: pt  Type:  RX Refill Request    Who Called: pt  Refill or New Rx:refill  RX Name and Strength: rosuvastatin 5 mg CpSP  How is the patient currently taking it? (ex. 1XDay):Route: Take 5 mg by mouth once daily  Is this a 30 day or 90 day RX:90  Preferred Pharmacy with phone number:   [facility] #[id number] [place] [address] AT [facility] OF Replaced by [facility] [address] Replaced by [facility] [address]  [address] [place] [address] [place] [address]  Phone: [phone] Fax: [phone]    Local or Mail Order:local    Ordering Provider:Jass    Would the patient rather a call back or a response via [facility]? Call back    Best Call Back Number:[phone]    Additional Information: sts he is in need for a refill the pt sts it should be 40 mg---please advise and thank you

## 2023-09-18 DIAGNOSIS — Z86.73 HISTORY OF CVA (CEREBROVASCULAR ACCIDENT): ICD-10-CM

## 2023-09-18 RX ORDER — ROSUVASTATIN CALCIUM 40 MG/1
40 TABLET, COATED ORAL NIGHTLY
Qty: 90 TABLET | Refills: 3 | Status: SHIPPED | OUTPATIENT
Start: 2023-09-18 | End: 2024-02-01

## 2023-09-18 NOTE — TELEPHONE ENCOUNTER
Pt upset do to dosage for RX not correct. Informed pt RX was sent as dosage on his chart. Pt currently takes 40 mg. Please send correct dosage

## 2023-09-18 NOTE — TELEPHONE ENCOUNTER
----- Message from Hua Pandey sent at 9/18/2023 12:49 PM CDT -----  Type: Needs Medical Advice  Who Called:  Patient    Pharmacy name and phone #:    [facility] #[id number] - [place], [place] - [address]  [address]  [place] [address]  Phone: [phone] Fax: [phone]        Best Call Back Number: [phone]  Additional Information: Patient states that his refill was sent in with the wrong dosage:  rosuvastatin 5 mg CpSP    Patient states that it was supposed to be 40 MG    Please call ASAP-- Pt is very upset

## 2023-09-26 ENCOUNTER — OFFICE VISIT (OUTPATIENT)
Dept: OTOLARYNGOLOGY | Facility: CLINIC | Age: 73
End: 2023-09-26
Payer: MEDICARE

## 2023-09-26 VITALS
BODY MASS INDEX: 33.18 KG/M2 | DIASTOLIC BLOOD PRESSURE: 76 MMHG | WEIGHT: 218.19 LBS | SYSTOLIC BLOOD PRESSURE: 122 MMHG

## 2023-09-26 DIAGNOSIS — J06.9 UPPER RESPIRATORY TRACT INFECTION, UNSPECIFIED TYPE: Primary | ICD-10-CM

## 2023-09-26 DIAGNOSIS — R42 DIZZINESS: ICD-10-CM

## 2023-09-26 PROCEDURE — 1159F MED LIST DOCD IN RCRD: CPT | Mod: CPTII,S$GLB,, | Performed by: OTOLARYNGOLOGY

## 2023-09-26 PROCEDURE — 1160F RVW MEDS BY RX/DR IN RCRD: CPT | Mod: CPTII,S$GLB,, | Performed by: OTOLARYNGOLOGY

## 2023-09-26 PROCEDURE — 3288F FALL RISK ASSESSMENT DOCD: CPT | Mod: CPTII,S$GLB,, | Performed by: OTOLARYNGOLOGY

## 2023-09-26 PROCEDURE — 99999 PR PBB SHADOW E&M-EST. PATIENT-LVL III: ICD-10-PCS | Mod: PBBFAC,,, | Performed by: OTOLARYNGOLOGY

## 2023-09-26 PROCEDURE — 1159F PR MEDICATION LIST DOCUMENTED IN MEDICAL RECORD: ICD-10-PCS | Mod: CPTII,S$GLB,, | Performed by: OTOLARYNGOLOGY

## 2023-09-26 PROCEDURE — 1126F PR PAIN SEVERITY QUANTIFIED, NO PAIN PRESENT: ICD-10-PCS | Mod: CPTII,S$GLB,, | Performed by: OTOLARYNGOLOGY

## 2023-09-26 PROCEDURE — 1101F PT FALLS ASSESS-DOCD LE1/YR: CPT | Mod: CPTII,S$GLB,, | Performed by: OTOLARYNGOLOGY

## 2023-09-26 PROCEDURE — 1126F AMNT PAIN NOTED NONE PRSNT: CPT | Mod: CPTII,S$GLB,, | Performed by: OTOLARYNGOLOGY

## 2023-09-26 PROCEDURE — 1101F PR PT FALLS ASSESS DOC 0-1 FALLS W/OUT INJ PAST YR: ICD-10-PCS | Mod: CPTII,S$GLB,, | Performed by: OTOLARYNGOLOGY

## 2023-09-26 PROCEDURE — 3008F PR BODY MASS INDEX (BMI) DOCUMENTED: ICD-10-PCS | Mod: CPTII,S$GLB,, | Performed by: OTOLARYNGOLOGY

## 2023-09-26 PROCEDURE — 99999 PR PBB SHADOW E&M-EST. PATIENT-LVL III: CPT | Mod: PBBFAC,,, | Performed by: OTOLARYNGOLOGY

## 2023-09-26 PROCEDURE — 3074F SYST BP LT 130 MM HG: CPT | Mod: CPTII,S$GLB,, | Performed by: OTOLARYNGOLOGY

## 2023-09-26 PROCEDURE — 3288F PR FALLS RISK ASSESSMENT DOCUMENTED: ICD-10-PCS | Mod: CPTII,S$GLB,, | Performed by: OTOLARYNGOLOGY

## 2023-09-26 PROCEDURE — 99213 OFFICE O/P EST LOW 20 MIN: CPT | Mod: S$GLB,,, | Performed by: OTOLARYNGOLOGY

## 2023-09-26 PROCEDURE — 1160F PR REVIEW ALL MEDS BY PRESCRIBER/CLIN PHARMACIST DOCUMENTED: ICD-10-PCS | Mod: CPTII,S$GLB,, | Performed by: OTOLARYNGOLOGY

## 2023-09-26 PROCEDURE — 3078F DIAST BP <80 MM HG: CPT | Mod: CPTII,S$GLB,, | Performed by: OTOLARYNGOLOGY

## 2023-09-26 PROCEDURE — 99213 PR OFFICE/OUTPT VISIT, EST, LEVL III, 20-29 MIN: ICD-10-PCS | Mod: S$GLB,,, | Performed by: OTOLARYNGOLOGY

## 2023-09-26 PROCEDURE — 3074F PR MOST RECENT SYSTOLIC BLOOD PRESSURE < 130 MM HG: ICD-10-PCS | Mod: CPTII,S$GLB,, | Performed by: OTOLARYNGOLOGY

## 2023-09-26 PROCEDURE — 3008F BODY MASS INDEX DOCD: CPT | Mod: CPTII,S$GLB,, | Performed by: OTOLARYNGOLOGY

## 2023-09-26 PROCEDURE — 3078F PR MOST RECENT DIASTOLIC BLOOD PRESSURE < 80 MM HG: ICD-10-PCS | Mod: CPTII,S$GLB,, | Performed by: OTOLARYNGOLOGY

## 2023-09-26 RX ORDER — AZITHROMYCIN 250 MG/1
TABLET, FILM COATED ORAL
Qty: 6 TABLET | Refills: 0 | Status: SHIPPED | OUTPATIENT
Start: 2023-09-26 | End: 2024-02-01

## 2023-09-26 RX ORDER — DEXAMETHASONE 2 MG/1
TABLET ORAL
Qty: 5 TABLET | Refills: 2 | Status: SHIPPED | OUTPATIENT
Start: 2023-09-26

## 2023-09-26 RX ORDER — NEOMYCIN SULFATE, POLYMYXIN B SULFATE AND DEXAMETHASONE 3.5; 10000; 1 MG/ML; [USP'U]/ML; MG/ML
SUSPENSION/ DROPS OPHTHALMIC
Qty: 5 ML | Refills: 3 | Status: SHIPPED | OUTPATIENT
Start: 2023-09-26

## 2023-09-26 NOTE — PROGRESS NOTES
Subjective:       Patient ID: Betzy Ortega is a 73 y.o. male.    Chief Complaint: Sinus Problem (Pt c/o inner ear problems, sinus infection with runny nose,mucus, and congestion )      This gentleman presents with his ear draining a little bit he is had a stuffy nose and a little cough for about five days he is had a little bit of dizziness that is sporadic occurs at random but only standing but not as he stands not when he is lying or sitting.    Sinus Problem        Objective:      ENT Physical Exam    His nose does look congested his ear looks a little bit pink of the ear skin and since drops help that last time I think it is reasonable to repeat that his oropharynx shows posterior pharyngeal banding.        Assessment:       1. Dizziness         Plan:

## 2023-09-26 NOTE — PROGRESS NOTES
Subjective:       Patient ID: Betzy Ortega is a 73 y.o. male.    Chief Complaint: Sinus Problem (Pt c/o inner ear problems, sinus infection with runny nose,mucus, and congestion )      This gentleman presents with his ear draining a little bit he is had a stuffy nose and a little cough for about five days he is had a little bit of dizziness that is sporadic occurs at random but only standing but not as he stands not when he is lying or sitting.    Sinus Problem        Objective:      ENT Physical Exam    His nose does look congested his ear looks a little bit pink of the ear skin and since drops help that last time I think it is reasonable to repeat that his oropharynx shows posterior pharyngeal banding.        Assessment:       1. Upper respiratory tract infection, unspecified type    2. Dizziness         Plan:          So I sent in some more drops they helped him before, some antibiotics and some steroids for his upper respiratory tract infection and his appearance of posterior pharyngeal banding in nasal congestion

## 2023-10-05 ENCOUNTER — TELEPHONE (OUTPATIENT)
Dept: FAMILY MEDICINE | Facility: CLINIC | Age: 73
End: 2023-10-05
Payer: MEDICARE

## 2023-10-05 NOTE — TELEPHONE ENCOUNTER
Attempted to contact Betzy Ortega to discuss  medication .    Left voice mail to return our call at [phone] on [phone] (home).    Brie Zaragoza LPN

## 2023-10-05 NOTE — TELEPHONE ENCOUNTER
----- Message from Brie Zaragoza LPN sent at 10/4/2023  4:47 PM CDT -----  Regarding: FW: advice  Contact: patient    ----- Message -----  From: Jailene Smith  Sent: 10/4/2023   4:43 PM CDT  To: Amy SQUIRES Staff  Subject: advice                                           Type: Needs Medical Advice  Who Called:  patient  Symptoms (please be specific):    How long has patient had these symptoms:    Pharmacy name and phone #:    [facility] #[id number] - [place] [address] [address]  [address]  [facility] [address]  Phone: [phone] Fax: [phone]  Best Call Back Number: [phone] (home)     Additional Information: Please call patient concerning his prescriptions.  Patient has called 3 times.  Thanks!

## 2023-10-17 RX ORDER — PANTOPRAZOLE SODIUM 40 MG/1
40 TABLET, DELAYED RELEASE ORAL DAILY
Qty: 90 TABLET | Refills: 3 | Status: SHIPPED | OUTPATIENT
Start: 2023-10-17

## 2023-11-01 DIAGNOSIS — F51.01 PRIMARY INSOMNIA: ICD-10-CM

## 2023-11-01 RX ORDER — ESZOPICLONE 3 MG/1
3 TABLET, FILM COATED ORAL NIGHTLY PRN
Qty: 30 TABLET | Refills: 2 | Status: SHIPPED | OUTPATIENT
Start: 2023-11-01

## 2023-11-01 NOTE — TELEPHONE ENCOUNTER
Chart reviewed, [place]  reviewed.  I sent in a 30 day supply with 2 refill on medication to pharmacy requested.

## 2023-11-01 NOTE — TELEPHONE ENCOUNTER
----- Message from Miranda Becerra sent at 11/1/2023 11:37 AM CDT -----  Contact: PT  Type:  RX Refill Request    Who Called: PT   Refill or New Rx:REFILL   RX Name and Strength: eszopiclone (LUNESTA) 3 mg Tab  How is the patient currently taking it? (ex. 1XDay):ONE TAB ONCE A DAY   Is this a 30 day or 90 day RX: 90  Preferred Pharmacy with phone number:   [facility] #[id number] - [place] [address] [address]  [address] [place] [address]  Phone: [phone] Fax: [phone]  Local or Mail Order:LOCAL  Ordering Provider:CHELSEY  Would the patient rather a call back or a response via [facility]? CALL   Best Call Back Number:[phone] (home)   Additional Information: THANK YOU

## 2023-12-11 NOTE — TELEPHONE ENCOUNTER
Consent and Hep b status verified, removed 1500 uf net, no issues with access, post thrill and bruit noted. Patient stable throughout treatment. Educated patient on hd treatment. Report given to AMY Frazier     12/11/23 1230   Handoff Report   Received From Mary Carmen   Given To Karin   Vital Signs   Temp 98 °F (36.7 °C)   Temp Source Oral   Pulse (!) 54   Heart Rate Source Monitor   Resp (!) 25   SpO2 99 %   Pulse Oximetry Type Continuous   Device (Oxygen Therapy) room air   BP (!) 140/62   BP Location Left arm   BP Method Automatic   Patient Position Lying   Assessments (Pre/Post)   Consent Obtained yes   Safety vein preservation armband present   Date Hepatitis Profile Obtained 11/10/23   Blood Liters Processed (BLP) 72   Transport Modality bed   Level of Consciousness (AVPU) alert   Dialyzer Clearance mildly streaked   Pain   Preferred Pain Scale number (Numeric Rating Pain Scale)   Comfort/Acceptable Pain Level 0   Pain Rating (0-10): Rest 0   Pain Rating (0-10): Activity 0   Pain Management Interventions quiet environment facilitated;position adjusted;pillow support provided   Pain/Comfort Interventions   Fever Reduction/Comfort Measures lightweight clothing;lightweight bedding   Pre-Hemodialysis Assessment   Additional Dialysis Information Needed Yes   Patient Status Departed   Treatment Consent Verified Yes   Treatment Status Completed        Hemodialysis AV Fistula Right upper arm   No Placement Date or Time found.   Present Prior to Hospital Arrival?: Yes  Location: Right upper arm   Site Assessment Clean;Dry;Intact   Patency Present;Thrill;Bruit   Status Deaccessed   Dressing Intervention First dressing   Dressing Status Clean;Dry;Intact   Site Condition No complications   Dressing Gauze   Post-Hemodialysis Assessment   Rinseback Volume (mL) 250 mL   Blood Volume Processed (Liters) 72 L   Dialyzer Clearance Lightly streaked   Duration of Treatment 180 minutes   Additional Fluid Intake (mL) 500 mL   Total UF  This patient was contacted regarding a referral  to have the monoclonal antibody administration at [facility]. It was explained to the patient we are administering the medication by injections into the tissue, instead of the intravenous infusion. It was further explained it consist of four small needle injections with a 90-minute appointment.     It was emphasized to the patient you must bring a copy of the positive covid-19 test outcome to the appointment. It was additionally explained to the patient, the positive test must be from a health care provider to receive the antibody injections. The patient acknowledged understanding having to bring  a copy of the positive covid-19 test to the antibody injection appointment.    (mL) 2000 mL   Net Fluid Removal 1500   Patient Response to Treatment daniela   Post-Treatment Weight 49 kg (108 lb 0.4 oz)   Treatment Weight Change -1.6   Arterial bleeding stop time (min) 6 min   Venous bleeding stop time (min) 6 min   Post-Hemodialysis Comments stable

## 2023-12-12 ENCOUNTER — TELEPHONE (OUTPATIENT)
Dept: FAMILY MEDICINE | Facility: CLINIC | Age: 73
End: 2023-12-12
Payer: MEDICARE

## 2023-12-12 NOTE — TELEPHONE ENCOUNTER
----- Message from Jailene Luis sent at 12/12/2023  3:50 PM CST -----  Regarding: medication clarification  Contact: patient  Type: Needs Medical Advice  Who Called:  patient  Symptoms (please be specific):    How long has patient had these symptoms:    Pharmacy name and phone #:      [id number] - [place] [address] [address]  [address]  [place] [address]  Phone: [phone] Fax: [phone]      Best Call Back Number: [phone] (home)     Additional Information: Patient states he picked up his QUEtiapine (SEROQUEL) 100 MG Tab says take 1 time daily.  Patient has been taking the medication 3 x daily for 10 years.  Please call patient and pharmacy to advise.  Thanks!

## 2023-12-12 NOTE — TELEPHONE ENCOUNTER
Spoke with pt. Pt reports taking Seroquel 100 mg TID,yet new RX states q evening. Informed pt appointment is needed. Appointment scheduled. Pt voiced understanding.

## 2024-01-31 ENCOUNTER — TELEPHONE (OUTPATIENT)
Dept: FAMILY MEDICINE | Facility: CLINIC | Age: 74
End: 2024-01-31
Payer: MEDICARE

## 2024-01-31 NOTE — TELEPHONE ENCOUNTER
----- Message from Mel Baum sent at 1/31/2024 12:21 PM CST -----  Contact: PT  Type:  Sooner Appointment Request    Caller is requesting a sooner appointment.  Caller declined first available appointment listed below.  Caller will not accept being placed on the waitlist and is requesting a message be sent to doctor.    Name of Caller:  PT  When is the first available appointment?  6/17/24  Symptoms:  ECA, Check UP,Medication Refills  Would the patient rather a call back or a response via [facility]? Call back  Best Call Back Number:  [phone]  Additional Information:  PT asking to be seen next week if possible his medication is running low

## 2024-01-31 NOTE — TELEPHONE ENCOUNTER
----- Message from Mel Bautista sent at 1/31/2024 12:07 PM CST -----  Contact: PT  Type:  RX Refill Request    Who Called:  PT  Refill or New Rx: refill  RX Name and Strength:  QUEtiapine (SEROQUEL) 100 MG Tab  How is the patient currently taking it? Take  1 tablet by mouth 3 times daily  Is this a 30 day or 90 day RX: 90  Preferred Pharmacy with phone number:    [id number] [place] [address] AT [facility] [address] [place] [address]  Phone: [phone] Fax: [phone]    Best Call Back Number:  [phone]  Additional Information: PT states about to run out of medication states he has been taking it 3 times daily for years.

## 2024-02-01 ENCOUNTER — OFFICE VISIT (OUTPATIENT)
Dept: FAMILY MEDICINE | Facility: CLINIC | Age: 74
End: 2024-02-01
Payer: MEDICARE

## 2024-02-01 VITALS
BODY MASS INDEX: 33.04 KG/M2 | HEART RATE: 76 BPM | SYSTOLIC BLOOD PRESSURE: 112 MMHG | OXYGEN SATURATION: 95 % | HEIGHT: 68 IN | WEIGHT: 218 LBS | DIASTOLIC BLOOD PRESSURE: 64 MMHG | RESPIRATION RATE: 18 BRPM

## 2024-02-01 DIAGNOSIS — Z86.73 HISTORY OF CVA (CEREBROVASCULAR ACCIDENT): ICD-10-CM

## 2024-02-01 DIAGNOSIS — I10 ESSENTIAL HYPERTENSION: ICD-10-CM

## 2024-02-01 DIAGNOSIS — F51.01 PRIMARY INSOMNIA: ICD-10-CM

## 2024-02-01 DIAGNOSIS — R10.13 DYSPEPSIA: ICD-10-CM

## 2024-02-01 DIAGNOSIS — N18.31 STAGE 3A CHRONIC KIDNEY DISEASE: ICD-10-CM

## 2024-02-01 DIAGNOSIS — F41.9 ANXIETY: Primary | ICD-10-CM

## 2024-02-01 DIAGNOSIS — D69.6 THROMBOCYTOPENIA: ICD-10-CM

## 2024-02-01 PROCEDURE — 3008F BODY MASS INDEX DOCD: CPT | Mod: CPTII,S$GLB,, | Performed by: STUDENT IN AN ORGANIZED HEALTH CARE EDUCATION/TRAINING PROGRAM

## 2024-02-01 PROCEDURE — 3078F DIAST BP <80 MM HG: CPT | Mod: CPTII,S$GLB,, | Performed by: STUDENT IN AN ORGANIZED HEALTH CARE EDUCATION/TRAINING PROGRAM

## 2024-02-01 PROCEDURE — 1160F RVW MEDS BY RX/DR IN RCRD: CPT | Mod: CPTII,S$GLB,, | Performed by: STUDENT IN AN ORGANIZED HEALTH CARE EDUCATION/TRAINING PROGRAM

## 2024-02-01 PROCEDURE — 1159F MED LIST DOCD IN RCRD: CPT | Mod: CPTII,S$GLB,, | Performed by: STUDENT IN AN ORGANIZED HEALTH CARE EDUCATION/TRAINING PROGRAM

## 2024-02-01 PROCEDURE — 3074F SYST BP LT 130 MM HG: CPT | Mod: CPTII,S$GLB,, | Performed by: STUDENT IN AN ORGANIZED HEALTH CARE EDUCATION/TRAINING PROGRAM

## 2024-02-01 PROCEDURE — 99999 PR PBB SHADOW E&M-EST. PATIENT-LVL III: CPT | Mod: PBBFAC,,, | Performed by: STUDENT IN AN ORGANIZED HEALTH CARE EDUCATION/TRAINING PROGRAM

## 2024-02-01 PROCEDURE — 1126F AMNT PAIN NOTED NONE PRSNT: CPT | Mod: CPTII,S$GLB,, | Performed by: STUDENT IN AN ORGANIZED HEALTH CARE EDUCATION/TRAINING PROGRAM

## 2024-02-01 PROCEDURE — 1101F PT FALLS ASSESS-DOCD LE1/YR: CPT | Mod: CPTII,S$GLB,, | Performed by: STUDENT IN AN ORGANIZED HEALTH CARE EDUCATION/TRAINING PROGRAM

## 2024-02-01 PROCEDURE — 99214 OFFICE O/P EST MOD 30 MIN: CPT | Mod: S$GLB,,, | Performed by: STUDENT IN AN ORGANIZED HEALTH CARE EDUCATION/TRAINING PROGRAM

## 2024-02-01 PROCEDURE — 3288F FALL RISK ASSESSMENT DOCD: CPT | Mod: CPTII,S$GLB,, | Performed by: STUDENT IN AN ORGANIZED HEALTH CARE EDUCATION/TRAINING PROGRAM

## 2024-02-01 RX ORDER — QUETIAPINE FUMARATE 100 MG/1
100 TABLET, FILM COATED ORAL 3 TIMES DAILY
Qty: 210 TABLET | Refills: 11 | Status: SHIPPED | OUTPATIENT
Start: 2024-02-01 | End: 2026-05-21

## 2024-02-01 RX ORDER — QUETIAPINE FUMARATE 100 MG/1
100 TABLET, FILM COATED ORAL 3 TIMES DAILY
Qty: 90 TABLET | Refills: 11 | Status: SHIPPED | OUTPATIENT
Start: 2024-02-01 | End: 2024-02-01

## 2024-02-01 RX ORDER — BUSPIRONE HYDROCHLORIDE 30 MG/1
30 TABLET ORAL 2 TIMES DAILY
Qty: 180 TABLET | Refills: 11 | Status: SHIPPED | OUTPATIENT
Start: 2024-02-01 | End: 2027-01-16

## 2024-02-01 RX ORDER — BUSPIRONE HYDROCHLORIDE 30 MG/1
30 TABLET ORAL 2 TIMES DAILY
Qty: 60 TABLET | Refills: 11 | Status: SHIPPED | OUTPATIENT
Start: 2024-02-01 | End: 2024-02-01

## 2024-02-01 NOTE — TELEPHONE ENCOUNTER
----- Message from Kacie Larson sent at 2/1/2024  2:47 PM CST -----  Type:  Needs Medical Advice    Who Called: pt    Symptoms (please be specific): na     How long has patient had these symptoms:  na    Pharmacy name and phone #:      [id number] - [place] [place] [facility] [address] [facility] [address]  [address]  [place] [address]  Phone: [phone] Fax: [phone]      Would the patient rather a call back or a response via [facility]? Call back    Best Call Back Number: [phone]      Additional Information:pt was seen by Dr today and this was the only medication he couldn't remember the name or Mg so he is calling to inform the Dr of it so it can be refilled for a 3 month supply -  rosuvastatin (CRESTOR) 40 MG Tab      Please call Back to advise. Thanks!

## 2024-02-01 NOTE — PROGRESS NOTES
[facility] Note    Subjective:    The HPI and pertinent ROS is included in the Diagnostic Impression Remarks section at the end of the note. Please see below for further details. Chief complaint is at end of note.     KAREN is a pleasant intelligent patient who is here for evaluation.     Modified Medications    Modified Medication Previous Medication    QUETIAPINE (SEROQUEL) 100 MG TAB QUEtiapine (SEROQUEL) 100 MG Tab       Take 1 tablet (100 mg total) by mouth 3 (three) times daily.    Take 1 tablet (100 mg total) by mouth every evening.       Data reviewed 274}  Previous medical records reviewed and summarized in plan section at end of note.      If you are due for any health screening(s) below please notify me so we can arrange them to be ordered and scheduled. Most healthy patients at your age complete them, but you are free to accept or refuse. If you can't do it, I'll definitely understand. If you can, I'd certainly appreciate it!     Tests to Keep You Healthy    Colon Cancer Screening: DUE  Last Blood Pressure <= 139/89 (2/1/2024): Yes      The following portions of the patient's history were reviewed and updated as appropriate: allergies, current medications, past family history, past medical history, past social history, past surgical history and problem list.    He  has a past medical history of Anxiety, Cataract, Depression, Disorder of kidney and ureter, Hypertension, Lupus, and Stroke (04/2017).  He  has a past surgical history that includes cataract surgery (Left); cervical fusion cadivor bone; carpel tunnel hand (Bilateral); and Knee surgery (Left).    He  reports that he has never smoked. His smokeless tobacco use includes snuff. He reports that he does not drink alcohol and does not use drugs.  He family history includes Cancer in his mother; Pancreatic cancer in his brother; Prostate cancer in his father.    Review of patient's allergies indicates:   Allergen Reactions    Abilify  "[aripiprazole]     Irbesartan-hydrochlorothiazide     Losartan        Tobacco Use: High Risk (2/1/2024)    Patient History     Smoking Tobacco Use: Never     Smokeless Tobacco Use: Current     Passive Exposure: Not on file     Physical Examination  General appearance: alert, cooperative, no distress  Neck: no thyromegaly, no neck stiffness  Lungs: clear to auscultation, no wheezes, rales or rhonchi, symmetric air entry  Heart: normal rate, regular rhythm, normal S1, S2, no murmurs, rubs, clicks or gallops  Abdomen: soft, nontender, nondistended, no rigidity, rebound, or guarding.   Back: no point tenderness over spine  Extremities: peripheral pulses normal, no unilateral leg swelling or calf tenderness   Neurological:alert, oriented, normal speech, no new focal findings or movement disorder noted from baseline    BP Readings from Last 3 Encounters:   02/01/24 112/64   09/26/23 122/76   09/13/23 124/68     Wt Readings from Last 3 Encounters:   02/01/24 98.9 kg (218 lb)   09/26/23 99 kg (218 lb 3.2 oz)   09/13/23 99.3 kg (219 lb)     /64 (BP Location: Left arm, Patient Position: Sitting, BP Method: Large (Manual))   Pulse 76   Resp 18   Ht 5' 8" (1.727 m)   Wt 98.9 kg (218 lb)   SpO2 95%   BMI 33.15 kg/m²    274}  Laboratory: I have reviewed old labs below:    274}    Lab Results   Component Value Date    WBC 5.20 06/07/2023    HGB 14.2 06/07/2023    HCT 41.3 06/07/2023    MCV 86 06/07/2023     (L) 06/07/2023     06/07/2023    K 3.9 06/07/2023     06/07/2023    CALCIUM 9.5 06/07/2023    CO2 23 06/07/2023     06/07/2023    BUN 14 06/07/2023    CREATININE 1.5 (H) 06/07/2023    EGFRNORACEVR 48.9 (A) 06/07/2023    ANIONGAP 12 06/07/2023    PROT 6.8 06/07/2023    ALBUMIN 4.1 06/07/2023    BILITOT 1.5 (H) 06/07/2023    ALKPHOS 52 (L) 06/07/2023    ALT 18 06/07/2023    AST 20 06/07/2023    CHOL 174 10/31/2019    TRIG 169 (H) 10/31/2019    HDL 45 10/31/2019    LDLCALC 95.2 10/31/2019    " "TSH 3.517 07/12/2023    PSA 1.4 06/07/2023      Lab reviewed by me: Particular labs of significance that I will monitor, workup, or treat to improve are mentioned below in diagnostic impression remarks.    Imaging/EKG: I have reviewed the pertinent results and my findings are noted in remarks.  274}    CC:   Chief Complaint   Patient presents with    Medication Refill        274}    Assessment/Plan  Betzy Ortega is a 73 y.o. male who presents to clinic with:  1. Anxiety    2. Stage 3a chronic kidney disease    3. Thrombocytopenia    4. Dyspepsia    5. Essential hypertension    6. Primary insomnia       274}  Diagnostic Impression Remarks + HPI     Documentation entered by me for this encounter may have been done in part using speech-recognition technology. Although I have made an effort to ensure accuracy, "sound like" errors may exist and should be interpreted in context.     Patient here to establish care. Anxiety is not well controlled taking buspar 15mg bid, seroquel 300mg daily and trazodone 200mg. Will increase buspar to 30mg bid  CKD: likely d/t age and long standing htn, will monitor  Thrombocytopenia: chronic, likely benign, will monitor  HTN: controlled. Agreed to sign up to digital medicine. Contniue  Insomnia: continue with seroquel, trazodone      This is the extent of this pleasant patient's concerns at this present time. He did not feel chest pain upon exertion, dyspnea, nausea, vomiting, diaphoresis, or syncope. No pleuritic chest pain, unilateral leg swelling, calf tenderness, or calf pain. Negative for unintentional weight loss night sweats, hematuria, and fevers. Betzy will return to clinic in a few months for further workup and reassessment or sooner as needed. He was instructed to call the clinic or go to the emergency department or urgent care immediately if his symptoms do not improve, worsens, or if any new symptoms develop. As we discussed that symptoms could worsen over the next 24 " hours he was advised that if any increased swelling, pain, or numbness arise to go immediately to the ED. Patient knows to call any time if an emergency arises. Shared decision making occurred and he verbalized understanding in agreement with this plan. I discussed imaging findings, diagnosis, possibilities, treatment options, medications, risks, and benefits. He had many questions regarding the options and long-term effects. All questions were answered. He expressed understanding after counseling regarding the diagnosis and recommendations. He was capable and demonstrated competence with understanding of these options. Shared decision making was performed resulting in him choosing the current treatment plan. Patient handout was given with instructions and recommendations. Advised the patient that if they become pregnant to alert us immediately to assess for medication changes. Having a healthy weight can decrease the risk of 13 cancers and is an important goal. I also discussed the importance of close follow up to discuss labs, change or modify his medications if needed, monitor side effects, and further evaluation of medical problems.     Additional workup planned: see labs ordered below.    See below for labs and meds ordered with associated diagnosis      1. Anxiety  - busPIRone (BUSPAR) 30 MG Tab; Take 1 tablet (30 mg total) by mouth 2 (two) times daily.  Dispense: 60 tablet; Refill: 11    2. Stage 3a chronic kidney disease    3. Thrombocytopenia    4. Dyspepsia  - simethicone 125 mg Tab; Take 1 tablet by mouth once daily.  Dispense: 90 tablet; Refill: 3    5. Essential hypertension  - Hypertension Digital Medicine (HDMP) Enrollment Order    6. Primary insomnia  - QUEtiapine (SEROQUEL) 100 MG Tab; Take 1 tablet (100 mg total) by mouth 3 (three) times daily.  Dispense: 90 tablet; Refill: 11      Jenna Fuentes MD   274}    If you are due for any health screening(s) below please notify me so we can arrange them to  be ordered and scheduled. Most healthy patients at your age complete them, but you are free to accept or refuse.     If you can't do it, I'll definitely understand. If you can, I'd certainly appreciate it!   Tests to Keep You Healthy    Colon Cancer Screening: DUE  Last Blood Pressure <= 139/89 (2/1/2024): Yes

## 2024-02-02 ENCOUNTER — PATIENT MESSAGE (OUTPATIENT)
Dept: FAMILY MEDICINE | Facility: CLINIC | Age: 74
End: 2024-02-02
Payer: MEDICARE

## 2024-02-14 ENCOUNTER — TELEPHONE (OUTPATIENT)
Dept: FAMILY MEDICINE | Facility: CLINIC | Age: 74
End: 2024-02-14
Payer: MEDICARE

## 2024-02-14 NOTE — TELEPHONE ENCOUNTER
----- Message from Margarita Mendez sent at 2/14/2024 10:48 AM CST -----  Type:  RX Refill Request    Who Called:  Pt    Refill or New Rx:  refill    RX Name and Strength:  valACYclovir (VALTREX) 1000 MG tablet    How is the patient currently taking it? (ex. 1XDay):  as directed    Is this a 30 day or 90 day RX:  90    Preferred Pharmacy with phone number:    [facility] #[id number] [place], [place] - [address] [place] [address] AT [facility] [address]  [address] [address]  Phone: [phone] Fax: [phone]    Local or Mail Order:  Local    Ordering Provider:  Previous Dr     Would the patient rather a call back or a response via [facility]?  Call back    Best Call Back Number:  [phone]    Additional Information:  Pt is needing a refill.   Please call back to advise. Thanks!

## 2024-02-16 RX ORDER — VALACYCLOVIR HYDROCHLORIDE 1 G/1
1000 TABLET, FILM COATED ORAL EVERY 8 HOURS
Qty: 30 TABLET | Refills: 0 | Status: SHIPPED | OUTPATIENT
Start: 2024-02-16

## 2024-02-16 NOTE — TELEPHONE ENCOUNTER
"Spoke with pt. Pt requesting refill on RX. Pt states, " I have herpes, am having a outbreak. This medication helps calm down the outbreak." Please advise  "

## 2024-02-22 ENCOUNTER — TELEPHONE (OUTPATIENT)
Dept: FAMILY MEDICINE | Facility: CLINIC | Age: 74
End: 2024-02-22
Payer: MEDICARE

## 2024-02-22 NOTE — TELEPHONE ENCOUNTER
----- Message from Ana Choudhary sent at 2/22/2024 10:57 AM CST -----  Contact: self  Type: Needs Medical Advice  Who Called:  pt  Best Call Back Number: [phone]   Additional Information: pt states his previous appt was suppose to be with dr kingston and somehow he was placed with dr london he has an appt on 8/1 with dr london and would like to change it to dr kingston.dr kingston is who he would like to establish with.please advise

## 2024-04-04 ENCOUNTER — TELEPHONE (OUTPATIENT)
Dept: FAMILY MEDICINE | Facility: CLINIC | Age: 74
End: 2024-04-04
Payer: MEDICARE

## 2024-04-04 DIAGNOSIS — F51.01 PRIMARY INSOMNIA: Primary | ICD-10-CM

## 2024-04-04 RX ORDER — DOXEPIN HYDROCHLORIDE 10 MG/1
10 CAPSULE ORAL NIGHTLY
Qty: 30 CAPSULE | Refills: 11 | Status: SHIPPED | OUTPATIENT
Start: 2024-04-04 | End: 2025-04-04

## 2024-04-04 NOTE — TELEPHONE ENCOUNTER
----- Message from Miranda Becerra sent at 4/4/2024 12:08 PM CDT -----  Contact: PT  Type:  RX Refill Request    Who Called: PT   Refill or New Rx:REFILL   RX Name and Strength: eszopiclone (LUNESTA) 3 mg Tab  How is the patient currently taking it? (ex. 1XDay): ONE TAB ONCE A DAY   Is this a 30 day or 90 day RX: 90  Preferred Pharmacy with phone number:   [facility] #[id number] - [place] [address] [address]  [address] [place] [address]  Phone: [phone] Fax: [phone]  Local or Mail Order:LOCAL   Ordering Provider:DC   Would the patient rather a call back or a response via [facility]? CALL   Best Call Back Number: [phone] (home)   Additional Information: THANK YOU

## 2024-04-04 NOTE — TELEPHONE ENCOUNTER
"Pt requesting refill on Lunesta     Informed pt he is currently on Seroquel. Pt states, " I have been on both." Please advise,   "

## 2024-04-05 ENCOUNTER — TELEPHONE (OUTPATIENT)
Dept: FAMILY MEDICINE | Facility: CLINIC | Age: 74
End: 2024-04-05
Payer: MEDICARE

## 2024-04-05 NOTE — TELEPHONE ENCOUNTER
----- Message from Ramon Maurice sent at 4/5/2024  2:18 PM CDT -----  Contact: self  Type:  Patient Returning Call    Who Called:  PT  Who Left Message for Patient:  Brie  Does the patient know what this is regarding?:  Yes  Best Call Back Number:  [phone]    Additional Information:

## 2024-06-17 ENCOUNTER — TELEPHONE (OUTPATIENT)
Dept: FAMILY MEDICINE | Facility: CLINIC | Age: 74
End: 2024-06-17
Payer: MEDICARE

## 2024-06-17 NOTE — TELEPHONE ENCOUNTER
----- Message from Lisa Ly sent at 6/17/2024  3:01 PM CDT -----  Regarding: Needs refills  Type:  RX Refill Request    Who Called:  Pt  Refill or New Rx:  refill  RX Name and Strength:  Dapasone 25mg  How is the patient currently taking it? (ex. 1XDay):  see chart  Is this a 30 day or 90 day RX:  90 day   Preferred Pharmacy with phone number:    [facility] #[id number] [facility], [place] - [address] [place] [address] AT [facility] [address]  [address]  [place] [address]  Phone: [phone] Fax: [phone]    Local or Mail Order:  local  Ordering Provider:  surya Regalado Call Back Number:  [phone]    Additional Information:Pt is needign a call back today regarding this he wants to see if it can be sent to where he is currently at he said it all depends on when its getting sent to where it needs to go please rita

## 2024-06-18 ENCOUNTER — TELEPHONE (OUTPATIENT)
Dept: FAMILY MEDICINE | Facility: CLINIC | Age: 74
End: 2024-06-18
Payer: MEDICARE

## 2024-06-18 NOTE — TELEPHONE ENCOUNTER
----- Message from Mel Baum sent at 6/18/2024 10:25 AM CDT -----  Contact: PT  Type:  Patient Returning Call    Who Called:  PT  Who Left Message for Patient:  Brie  Does the patient know what this is regarding?:  yes  Best Call Back Number:  [phone]  Additional Information:  Pt asking for call back asap

## 2024-06-18 NOTE — TELEPHONE ENCOUNTER
----- Message from Mel Baum sent at 6/18/2024 10:23 AM CDT -----  Contact: PT  Type:  Patient Returning Call    Who Called:  PT  Who Left Message for Patient:  Brie  Does the patient know what this is regarding?:  yes  Best Call Back Number:  [phone]  Additional Information:  Pt asking for call back asap

## 2024-06-18 NOTE — TELEPHONE ENCOUNTER
Attempted to contact Betzy Ortega .    Left voice mail to return our call at [phone] on [phone] (home).    Brie Zaragoza LPN

## 2024-06-18 NOTE — TELEPHONE ENCOUNTER
----- Message from Miranda Becerra sent at 6/18/2024 10:54 AM CDT -----  Contact: PT  Type:  Patient Returning Call    Who Called:PT  Who Left Message for Patient:JEVON  Does the patient know what this is regarding?:N./A  Would the patient rather a call back or a response via [facility]? CALL  Best Call Back Number:[phone] (home)   Additional Information: THANK YOU

## 2024-06-19 DIAGNOSIS — D69.6 THROMBOCYTOPENIA: Primary | ICD-10-CM

## 2024-06-19 DIAGNOSIS — I10 ESSENTIAL HYPERTENSION: ICD-10-CM

## 2024-06-19 RX ORDER — DAPSONE 25 MG/1
25 TABLET ORAL DAILY
Qty: 90 TABLET | Refills: 3 | Status: SHIPPED | OUTPATIENT
Start: 2024-06-19

## 2024-06-19 NOTE — TELEPHONE ENCOUNTER
"Spoke with pt. Pt requesting refill on medication due " blood Disorder" pended to MD for approval   "

## 2024-06-19 NOTE — TELEPHONE ENCOUNTER
----- Message from Juliet Hoover sent at 6/19/2024  9:37 AM CDT -----  Regarding: call back  Contact: pt  Type: Needs Medical Advice  Who Called:  patient  Symptoms (please be specific):    How long has patient had these symptoms:    Pharmacy name and phone #:  [facility] [place] #: [phone]  Best Call Back Number: [phone]    Additional Information: he is out of town; petrona morales pt is still waiting on dapsone 25 MG Tab are u available to advise MRN: [id number] KELSEY SHARMA

## 2024-07-24 ENCOUNTER — TELEPHONE (OUTPATIENT)
Dept: FAMILY MEDICINE | Facility: CLINIC | Age: 74
End: 2024-07-24
Payer: MEDICARE

## 2024-07-24 NOTE — TELEPHONE ENCOUNTER
----- Message from Jailene Smith sent at 7/24/2024 11:50 AM CDT -----  Regarding: return call  Contact: patient  Type:  Patient Returning Call    Who Called:  patient  Who Left Message for Patient:    Does the patient know what this is regarding?:    Best Call Back Number:  [phone] (home)     Additional Information:  Patient states he received a text to call the office.  Please call patient to advise.  Thanks!

## 2024-08-01 ENCOUNTER — HOSPITAL ENCOUNTER (OUTPATIENT)
Dept: RADIOLOGY | Facility: HOSPITAL | Age: 74
Discharge: HOME | End: 2024-08-01
Attending: FAMILY MEDICINE
Payer: MEDICARE

## 2024-08-01 ENCOUNTER — HOSPITAL ENCOUNTER (OUTPATIENT)
Dept: CARDIOLOGY | Facility: HOSPITAL | Age: 74
Discharge: HOME | End: 2024-08-01
Attending: FAMILY MEDICINE
Payer: MEDICARE

## 2024-08-01 ENCOUNTER — OFFICE VISIT (OUTPATIENT)
Dept: FAMILY MEDICINE | Facility: CLINIC | Age: 74
End: 2024-08-01
Payer: MEDICARE

## 2024-08-01 VITALS
HEART RATE: 62 BPM | HEIGHT: 68 IN | OXYGEN SATURATION: 96 % | WEIGHT: 210 LBS | RESPIRATION RATE: 14 BRPM | BODY MASS INDEX: 31.83 KG/M2 | DIASTOLIC BLOOD PRESSURE: 68 MMHG | SYSTOLIC BLOOD PRESSURE: 126 MMHG

## 2024-08-01 DIAGNOSIS — I10 PRIMARY HYPERTENSION: ICD-10-CM

## 2024-08-01 DIAGNOSIS — Z13.6 ENCOUNTER FOR LIPID SCREENING FOR CARDIOVASCULAR DISEASE: ICD-10-CM

## 2024-08-01 DIAGNOSIS — G90.1 DYSAUTONOMIA: ICD-10-CM

## 2024-08-01 DIAGNOSIS — Z13.1 DIABETES MELLITUS SCREENING: ICD-10-CM

## 2024-08-01 DIAGNOSIS — F41.9 ANXIETY: ICD-10-CM

## 2024-08-01 DIAGNOSIS — Z76.89 ESTABLISHING CARE WITH NEW DOCTOR, ENCOUNTER FOR: Primary | ICD-10-CM

## 2024-08-01 DIAGNOSIS — Z12.11 COLON CANCER SCREENING: ICD-10-CM

## 2024-08-01 DIAGNOSIS — R76.8 POSITIVE ANA (ANTINUCLEAR ANTIBODY): ICD-10-CM

## 2024-08-01 DIAGNOSIS — R68.2 DRY MOUTH: ICD-10-CM

## 2024-08-01 DIAGNOSIS — Z12.5 SCREENING FOR PROSTATE CANCER: ICD-10-CM

## 2024-08-01 DIAGNOSIS — R53.82 CHRONIC FATIGUE: ICD-10-CM

## 2024-08-01 DIAGNOSIS — R94.6 ABNORMAL THYROID FUNCTION TEST: ICD-10-CM

## 2024-08-01 DIAGNOSIS — Z79.899 OTHER LONG TERM (CURRENT) DRUG THERAPY: ICD-10-CM

## 2024-08-01 DIAGNOSIS — N18.31 STAGE 3A CHRONIC KIDNEY DISEASE: ICD-10-CM

## 2024-08-01 DIAGNOSIS — D69.6 THROMBOCYTOPENIA: ICD-10-CM

## 2024-08-01 DIAGNOSIS — R79.9 ABNORMAL FINDING OF BLOOD CHEMISTRY, UNSPECIFIED: ICD-10-CM

## 2024-08-01 DIAGNOSIS — E55.9 VITAMIN D DEFICIENCY: ICD-10-CM

## 2024-08-01 DIAGNOSIS — Z13.220 ENCOUNTER FOR LIPID SCREENING FOR CARDIOVASCULAR DISEASE: ICD-10-CM

## 2024-08-01 DIAGNOSIS — R22.9 MASS OF SUBCUTANEOUS TISSUE: ICD-10-CM

## 2024-08-01 DIAGNOSIS — R42 DIZZINESS: ICD-10-CM

## 2024-08-01 DIAGNOSIS — Z11.59 NEED FOR HEPATITIS C SCREENING TEST: ICD-10-CM

## 2024-08-01 LAB
OHS QRS DURATION: 84 MS
OHS QTC CALCULATION: 438 MS

## 2024-08-01 PROCEDURE — 3288F FALL RISK ASSESSMENT DOCD: CPT | Mod: CPTII,S$GLB,, | Performed by: FAMILY MEDICINE

## 2024-08-01 PROCEDURE — 99999 PR PBB SHADOW E&M-EST. PATIENT-LVL III: CPT | Mod: PBBFAC,,, | Performed by: FAMILY MEDICINE

## 2024-08-01 PROCEDURE — 1126F AMNT PAIN NOTED NONE PRSNT: CPT | Mod: CPTII,S$GLB,, | Performed by: FAMILY MEDICINE

## 2024-08-01 PROCEDURE — 93010 ELECTROCARDIOGRAM REPORT: CPT | Mod: ,,, | Performed by: INTERNAL MEDICINE

## 2024-08-01 PROCEDURE — 1101F PT FALLS ASSESS-DOCD LE1/YR: CPT | Mod: CPTII,S$GLB,, | Performed by: FAMILY MEDICINE

## 2024-08-01 PROCEDURE — 3078F DIAST BP <80 MM HG: CPT | Mod: CPTII,S$GLB,, | Performed by: FAMILY MEDICINE

## 2024-08-01 PROCEDURE — 71046 X-RAY EXAM CHEST 2 VIEWS: CPT | Mod: 26,,, | Performed by: RADIOLOGY

## 2024-08-01 PROCEDURE — 99215 OFFICE O/P EST HI 40 MIN: CPT | Mod: S$GLB,,, | Performed by: FAMILY MEDICINE

## 2024-08-01 PROCEDURE — 71046 X-RAY EXAM CHEST 2 VIEWS: CPT | Mod: TC

## 2024-08-01 PROCEDURE — 93005 ELECTROCARDIOGRAM TRACING: CPT

## 2024-08-01 PROCEDURE — G2211 COMPLEX E/M VISIT ADD ON: HCPCS | Mod: S$GLB,,, | Performed by: FAMILY MEDICINE

## 2024-08-01 PROCEDURE — 3074F SYST BP LT 130 MM HG: CPT | Mod: CPTII,S$GLB,, | Performed by: FAMILY MEDICINE

## 2024-08-01 RX ORDER — ROSUVASTATIN CALCIUM 40 MG/1
40 TABLET, COATED ORAL
COMMUNITY
Start: 2024-06-17

## 2024-08-01 NOTE — PROGRESS NOTES
Subjective:       Patient ID: Betzy Ortega is a 74 y.o. male.    Chief Complaint: Establish Care, Dizziness (Gets disoriented occasionally with weakness, happens weekly, sometimes multiple times a week), dry mouth (Quit snuff 6 months ago, sometimes dry mouth, sometimes thick excessive salivations), and Gas      Past Medical History:   Diagnosis Date    Anxiety     Cataract     Depression     Disorder of kidney and ureter     Hypertension     Lupus     Stroke 04/2017       Past Surgical History:   Procedure Laterality Date    carpel tunnel hand Bilateral     cataract surgery Left     cervical fusion cadivor bone      KNEE SURGERY Left     OSTEOMYLITIS        Social History     Socioeconomic History    Marital status:    Tobacco Use    Smoking status: Never    Smokeless tobacco: Former     Types: Snuff     Quit date: 2/1/2024   Substance and Sexual Activity    Alcohol use: No    Drug use: No    Sexual activity: Not Currently       Family History   Problem Relation Name Age of Onset    Cancer Mother      Prostate cancer Father      Pancreatic cancer Brother         Review of patient's allergies indicates:   Allergen Reactions    Abilify [aripiprazole]     Irbesartan-hydrochlorothiazide     Losartan           Current Outpatient Medications:     ALPRAZolam (XANAX) 0.5 MG tablet, Take 1 tablet (0.5 mg total) by mouth 2 (two) times daily as needed for Anxiety., Disp: 60 tablet, Rfl: 2    amLODIPine (NORVASC) 2.5 MG tablet, Take 1 tablet (2.5 mg total) by mouth once daily., Disp: 30 tablet, Rfl: 11    aspirin (ECOTRIN) 81 MG EC tablet, Take 81 mg by mouth once daily., Disp: , Rfl:     busPIRone (BUSPAR) 30 MG Tab, Take 1 tablet (30 mg total) by mouth 2 (two) times daily., Disp: 180 tablet, Rfl: 11    dapsone 25 MG Tab, Take 1 tablet (25 mg total) by mouth once daily., Disp: 90 tablet, Rfl: 3    neomycin-polymyxin-dexamethasone (MAXITROL) 3.5mg/mL-10,000 unit/mL-0.1 % DrpS, 3 drops into affected ear(s) 4 times per  day, Disp: 5 mL, Rfl: 3    pantoprazole (PROTONIX) 40 MG tablet, Take 1 tablet (40 mg total) by mouth once daily., Disp: 90 tablet, Rfl: 3    QUEtiapine (SEROQUEL) 100 MG Tab, Take 1 tablet (100 mg total) by mouth 3 (three) times daily., Disp: 210 tablet, Rfl: 11    rosuvastatin (CRESTOR) 40 MG Tab, Take 40 mg by mouth., Disp: , Rfl:     tamsulosin (FLOMAX) 0.4 mg Cap, tamsulosin 0.4 mg capsule  TAKE 1 CAPSULE BY MOUTH EVERYDAY, Disp: 90 capsule, Rfl: 1    traZODone (DESYREL) 100 MG tablet, Take 2 tablets (200 mg total) by mouth once daily., Disp: 180 tablet, Rfl: 3    valACYclovir (VALTREX) 1000 MG tablet, Take 1 tablet (1,000 mg total) by mouth every 8 (eight) hours., Disp: 30 tablet, Rfl: 0    HPI  Review of Systems   Constitutional:  Positive for fatigue.   HENT:  Positive for postnasal drip.    Respiratory:  Negative for chest tightness and shortness of breath.    Cardiovascular:  Negative for chest pain, palpitations and leg swelling.   Gastrointestinal:  Positive for constipation.        Slow transit constipation, uses stimulant about 1 time per month   Endocrine: Negative for polyphagia and polyuria.   Genitourinary: Negative.    Musculoskeletal: Negative.    Neurological:  Positive for light-headedness. Negative for headaches.   Psychiatric/Behavioral:  Positive for sleep disturbance.        Objective:      Physical Exam  Vitals and nursing note reviewed.   Constitutional:       Appearance: Normal appearance. He is normal weight.   HENT:      Head: Normocephalic.      Nose: Nose normal.   Eyes:      Extraocular Movements: Extraocular movements intact.      Conjunctiva/sclera: Conjunctivae normal.      Pupils: Pupils are equal, round, and reactive to light.   Cardiovascular:      Rate and Rhythm: Normal rate and regular rhythm.      Heart sounds: Normal heart sounds. No murmur heard.  Pulmonary:      Effort: Pulmonary effort is normal. No respiratory distress.      Breath sounds: Normal breath sounds.    Musculoskeletal:         General: Swelling and tenderness present. Normal range of motion.      Cervical back: Normal range of motion and neck supple.      Comments: Small (butterbean sized) mass at the left sternoclavicular border, feels subcutaneous, tender to palpation   Skin:     General: Skin is warm and dry.   Neurological:      General: No focal deficit present.      Mental Status: He is alert and oriented to person, place, and time.      Comments: Doesn't want to do sleep study for sleep study   Psychiatric:         Mood and Affect: Mood normal.         Behavior: Behavior normal.         Assessment:       1. Establishing care with new doctor, encounter for    2. Thrombocytopenia    3. Anxiety    4. Stage 3a chronic kidney disease    5. Dizziness    6. Abnormal thyroid function test    7. Positive ABELARDO (antinuclear antibody)    8. Chronic fatigue    9. Vitamin D deficiency    10. Screening for prostate cancer    11. Diabetes mellitus screening    12. Encounter for lipid screening for cardiovascular disease    13. Primary hypertension    14. Other long term (current) drug therapy    15. Abnormal finding of blood chemistry, unspecified    16. Dry mouth    17. Colon cancer screening        Plan:         Establishing care with new doctor, encounter for  -     Microalbumin/Creatinine Ratio, Urine; Future; Expected date: 08/01/2024  -     Vitamin D; Future; Expected date: 08/01/2024  -     Vitamin B12; Future; Expected date: 08/01/2024  -     Lipid Panel; Future; Expected date: 08/01/2024  -     CBC Auto Differential; Future; Expected date: 08/01/2024  -     Comprehensive Metabolic Panel; Future; Expected date: 08/01/2024  -     Hemoglobin A1C; Future; Expected date: 08/01/2024  -     PSA, Screening; Future; Expected date: 08/01/2024    Thrombocytopenia  -     TSH; Future; Expected date: 08/01/2024    Anxiety  -     TSH; Future; Expected date: 08/01/2024    Stage 3a chronic kidney disease  -      Microalbumin/Creatinine Ratio, Urine; Future; Expected date: 08/01/2024  -     Comprehensive Metabolic Panel; Future; Expected date: 08/01/2024    Dizziness    Abnormal thyroid function test  -     TSH; Future; Expected date: 08/01/2024    Positive ABELARDO (antinuclear antibody)  -     ABELARDO Screen w/Reflex; Future; Expected date: 08/01/2024  -     C-Reactive Protein; Future; Expected date: 08/01/2024  -     Rheumatoid Factor; Future; Expected date: 08/01/2024  -     Sedimentation rate; Future; Expected date: 08/01/2024  -     Cyclic citrul peptide antibody, IgG; Future; Expected date: 08/01/2024    Chronic fatigue  -     ABELARDO Screen w/Reflex; Future; Expected date: 08/01/2024  -     C-Reactive Protein; Future; Expected date: 08/01/2024  -     Rheumatoid Factor; Future; Expected date: 08/01/2024  -     Sedimentation rate; Future; Expected date: 08/01/2024  -     Cyclic citrul peptide antibody, IgG; Future; Expected date: 08/01/2024    Vitamin D deficiency  -     Vitamin D; Future; Expected date: 08/01/2024    Screening for prostate cancer  -     PSA, Screening; Future; Expected date: 08/01/2024    Diabetes mellitus screening  -     Hemoglobin A1C; Future; Expected date: 08/01/2024    Encounter for lipid screening for cardiovascular disease  -     Lipid Panel; Future; Expected date: 08/01/2024    Primary hypertension  -     Microalbumin/Creatinine Ratio, Urine; Future; Expected date: 08/01/2024  -     CBC Auto Differential; Future; Expected date: 08/01/2024  -     Comprehensive Metabolic Panel; Future; Expected date: 08/01/2024  -     SCHEDULED EKG 12-LEAD (to Muse); Future  -     X-Ray Chest PA And Lateral; Future; Expected date: 08/01/2024    Other long term (current) drug therapy  -     Vitamin B12; Future; Expected date: 08/01/2024    Abnormal finding of blood chemistry, unspecified  -     Hemoglobin A1C; Future; Expected date: 08/01/2024    Dry mouth  -     ABELARDO Screen w/Reflex; Future; Expected date: 08/01/2024  -      C-Reactive Protein; Future; Expected date: 08/01/2024  -     Rheumatoid Factor; Future; Expected date: 08/01/2024  -     Sedimentation rate; Future; Expected date: 08/01/2024  -     Cyclic citrul peptide antibody, IgG; Future; Expected date: 08/01/2024    Colon cancer screening  -     Occult Blood Stool, CA Screen; Future; Expected date: 08/01/2024    Need for hepatitis C screening test  -     Hepatitis C antibody; Future; Expected date: 08/01/2024    Mass of subcutaneous tissue  -     US Soft Tissue Head Neck; Future; Expected date: 08/01/2024    In excessive of 40 minutes total time spent for evaluation and management services. Time included elements of the following: time spent preparing to see patient, obtaining and reviewing separately obtained history, exam, evaluation, counseling and education of patient/family member or care giver, documenting in the HMR, independently interpreting results and communication of results, coordination of care ordering medications, tests, or procedures, referral and communication to other health care professionals.      Risks, benefits, and side effects were discussed with the patient. All questions were answered to the fullest satisfaction of the patient, and pt verbalized understanding and agreement to treatment plan. Pt was to call with any new or worsening symptoms, or present to the ER.        Mariam Mckeon MD

## 2024-08-02 ENCOUNTER — LAB VISIT (OUTPATIENT)
Dept: LAB | Facility: HOSPITAL | Age: 74
End: 2024-08-02
Attending: FAMILY MEDICINE
Payer: MEDICARE

## 2024-08-02 ENCOUNTER — OFFICE VISIT (OUTPATIENT)
Dept: FAMILY MEDICINE | Facility: CLINIC | Age: 74
End: 2024-08-02
Payer: MEDICARE

## 2024-08-02 VITALS
WEIGHT: 201.19 LBS | SYSTOLIC BLOOD PRESSURE: 128 MMHG | HEIGHT: 68 IN | HEART RATE: 78 BPM | DIASTOLIC BLOOD PRESSURE: 84 MMHG | OXYGEN SATURATION: 95 % | RESPIRATION RATE: 14 BRPM | BODY MASS INDEX: 30.49 KG/M2

## 2024-08-02 DIAGNOSIS — R53.82 CHRONIC FATIGUE: ICD-10-CM

## 2024-08-02 DIAGNOSIS — Z76.89 ESTABLISHING CARE WITH NEW DOCTOR, ENCOUNTER FOR: ICD-10-CM

## 2024-08-02 DIAGNOSIS — R68.2 DRY MOUTH: ICD-10-CM

## 2024-08-02 DIAGNOSIS — E55.9 VITAMIN D DEFICIENCY: ICD-10-CM

## 2024-08-02 DIAGNOSIS — Z12.5 SCREENING FOR PROSTATE CANCER: ICD-10-CM

## 2024-08-02 DIAGNOSIS — D69.6 THROMBOCYTOPENIA: ICD-10-CM

## 2024-08-02 DIAGNOSIS — F51.01 PRIMARY INSOMNIA: ICD-10-CM

## 2024-08-02 DIAGNOSIS — Z13.1 DIABETES MELLITUS SCREENING: ICD-10-CM

## 2024-08-02 DIAGNOSIS — Z79.899 OTHER LONG TERM (CURRENT) DRUG THERAPY: ICD-10-CM

## 2024-08-02 DIAGNOSIS — R76.8 POSITIVE ANA (ANTINUCLEAR ANTIBODY): ICD-10-CM

## 2024-08-02 DIAGNOSIS — N18.31 STAGE 3A CHRONIC KIDNEY DISEASE: ICD-10-CM

## 2024-08-02 DIAGNOSIS — Z13.6 ENCOUNTER FOR LIPID SCREENING FOR CARDIOVASCULAR DISEASE: ICD-10-CM

## 2024-08-02 DIAGNOSIS — Z12.11 COLON CANCER SCREENING: ICD-10-CM

## 2024-08-02 DIAGNOSIS — F41.9 ANXIETY: ICD-10-CM

## 2024-08-02 DIAGNOSIS — R94.6 ABNORMAL THYROID FUNCTION TEST: ICD-10-CM

## 2024-08-02 DIAGNOSIS — Z13.220 ENCOUNTER FOR LIPID SCREENING FOR CARDIOVASCULAR DISEASE: ICD-10-CM

## 2024-08-02 DIAGNOSIS — R79.89 LOW SERUM VITAMIN D: Primary | ICD-10-CM

## 2024-08-02 DIAGNOSIS — R79.9 ABNORMAL FINDING OF BLOOD CHEMISTRY, UNSPECIFIED: ICD-10-CM

## 2024-08-02 DIAGNOSIS — Z11.59 NEED FOR HEPATITIS C SCREENING TEST: ICD-10-CM

## 2024-08-02 DIAGNOSIS — I10 PRIMARY HYPERTENSION: ICD-10-CM

## 2024-08-02 LAB — OB PNL STL: NEGATIVE

## 2024-08-02 PROCEDURE — 82270 OCCULT BLOOD FECES: CPT | Performed by: FAMILY MEDICINE

## 2024-08-02 PROCEDURE — 99999 PR PBB SHADOW E&M-EST. PATIENT-LVL III: CPT | Mod: PBBFAC,,, | Performed by: FAMILY MEDICINE

## 2024-08-02 RX ORDER — ALPRAZOLAM 0.5 MG/1
0.5 TABLET ORAL 2 TIMES DAILY PRN
Qty: 60 TABLET | Refills: 2 | Status: CANCELLED | OUTPATIENT
Start: 2024-08-02

## 2024-08-02 RX ORDER — DOXEPIN HYDROCHLORIDE 10 MG/1
10 CAPSULE ORAL NIGHTLY
Qty: 90 CAPSULE | Refills: 1 | Status: SHIPPED | OUTPATIENT
Start: 2024-08-02 | End: 2025-08-02

## 2024-08-02 RX ORDER — ERGOCALCIFEROL 1.25 MG/1
50000 CAPSULE ORAL
Qty: 13 CAPSULE | Refills: 3 | Status: SHIPPED | OUTPATIENT
Start: 2024-08-02

## 2024-08-02 NOTE — PROGRESS NOTES
Subjective:       Patient ID: Betzy Ortega is a 74 y.o. male.    Chief Complaint: Medication Refill (Xanax/) and Insomnia (Would also like something for sleep)      Past Medical History:   Diagnosis Date    Anxiety     Cataract     Depression     Disorder of kidney and ureter     Hypertension     Lupus     Stroke 04/2017       Past Surgical History:   Procedure Laterality Date    carpel tunnel hand Bilateral     cataract surgery Left     cervical fusion cadivor bone      KNEE SURGERY Left     OSTEOMYLITIS        Social History     Socioeconomic History    Marital status:    Tobacco Use    Smoking status: Never    Smokeless tobacco: Former     Types: Snuff     Quit date: 2/1/2024   Substance and Sexual Activity    Alcohol use: No    Drug use: No    Sexual activity: Not Currently       Family History   Problem Relation Name Age of Onset    Cancer Mother      Prostate cancer Father      Pancreatic cancer Brother         Review of patient's allergies indicates:   Allergen Reactions    Abilify [aripiprazole]     Irbesartan-hydrochlorothiazide     Losartan           Current Outpatient Medications:     ALPRAZolam (XANAX) 0.5 MG tablet, Take 1 tablet (0.5 mg total) by mouth 2 (two) times daily as needed for Anxiety., Disp: 60 tablet, Rfl: 2    amLODIPine (NORVASC) 2.5 MG tablet, Take 1 tablet (2.5 mg total) by mouth once daily., Disp: 30 tablet, Rfl: 11    aspirin (ECOTRIN) 81 MG EC tablet, Take 81 mg by mouth once daily., Disp: , Rfl:     busPIRone (BUSPAR) 30 MG Tab, Take 1 tablet (30 mg total) by mouth 2 (two) times daily., Disp: 180 tablet, Rfl: 11    dapsone 25 MG Tab, Take 1 tablet (25 mg total) by mouth once daily., Disp: 90 tablet, Rfl: 3    neomycin-polymyxin-dexamethasone (MAXITROL) 3.5mg/mL-10,000 unit/mL-0.1 % DrpS, 3 drops into affected ear(s) 4 times per day, Disp: 5 mL, Rfl: 3    pantoprazole (PROTONIX) 40 MG tablet, Take 1 tablet (40 mg total) by mouth once daily., Disp: 90 tablet, Rfl: 3     "QUEtiapine (SEROQUEL) 100 MG Tab, Take 1 tablet (100 mg total) by mouth 3 (three) times daily., Disp: 210 tablet, Rfl: 11    rosuvastatin (CRESTOR) 40 MG Tab, Take 40 mg by mouth., Disp: , Rfl:     tamsulosin (FLOMAX) 0.4 mg Cap, tamsulosin 0.4 mg capsule  TAKE 1 CAPSULE BY MOUTH EVERYDAY, Disp: 90 capsule, Rfl: 1    traZODone (DESYREL) 100 MG tablet, Take 2 tablets (200 mg total) by mouth once daily., Disp: 180 tablet, Rfl: 3    valACYclovir (VALTREX) 1000 MG tablet, Take 1 tablet (1,000 mg total) by mouth every 8 (eight) hours., Disp: 30 tablet, Rfl: 0    doxepin (SINEQUAN) 10 MG capsule, Take 1 capsule (10 mg total) by mouth every evening., Disp: 90 capsule, Rfl: 1    ergocalciferol (ERGOCALCIFEROL) 50,000 unit Cap, Take 1 capsule (50,000 Units total) by mouth every 7 days., Disp: 13 capsule, Rfl: 3      Mr. Ortega is a 74-year-old male who is here today with insomnia.  He was seen earlier this week to establish care did not request refills on his insomnia medication he is already on multiple sedative medications including 200 mg of trazodone and 300 mg Seroquel daily.  He has been prescribed doxepin 10 mg (but states it does not work) as well as Xanax 0.5 mg b.i.d Lunesta. he is requesting Xanax refills as well as Lunesta refills.  I do not feel comfortable prescribing anymore sedative type medications in this 74-year-old male.  He states that he has been on this combination for years,"and it works"..  I told  that I would refill the doxepin, but I would not refill Xanax and Lunesta.  I did tell him that if he would see a psychiatrist for his excessive anxiety, as he takes all of these medications for anxiety, I would follow the psychiatrist recommendations.  I told Mr. Ortega that I do not feel comfortable with the polypharmacy of the psychiatric medications.  I am not a psychiatrist and am uncomfortable prescribing high dose Seroquel, doxepin, Xanax, Lunesta and high dose trazodone " together.      Review of Systems   Psychiatric/Behavioral:  Positive for sleep disturbance. The patient is nervous/anxious.        Objective:      Physical Exam  HENT:      Head: Normocephalic.   Skin:     General: Skin is warm.   Neurological:      General: No focal deficit present.      Mental Status: He is alert and oriented to person, place, and time.   Psychiatric:         Mood and Affect: Mood normal.         Assessment:       1. Low serum vitamin D    2. Anxiety    3. Primary insomnia    4. Body mass index (BMI) 30.0-30.9, adult        Plan:         Low serum vitamin D  -     Vitamin D; Future; Expected date: 02/02/2025  -     ergocalciferol (ERGOCALCIFEROL) 50,000 unit Cap; Take 1 capsule (50,000 Units total) by mouth every 7 days.  Dispense: 13 capsule; Refill: 3    Anxiety  -     doxepin (SINEQUAN) 10 MG capsule; Take 1 capsule (10 mg total) by mouth every evening.  Dispense: 90 capsule; Refill: 1    Primary insomnia  -     doxepin (SINEQUAN) 10 MG capsule; Take 1 capsule (10 mg total) by mouth every evening.  Dispense: 90 capsule; Refill: 1    Body mass index (BMI) 30.0-30.9, adult  -     Vitamin D; Future; Expected date: 02/02/2025        Risks, benefits, and side effects were discussed with the patient. All questions were answered to the fullest satisfaction of the patient, and pt verbalized understanding and agreement to treatment plan. Pt was to call with any new or worsening symptoms, or present to the ER.        Mariam Mckeon MD

## 2024-08-05 ENCOUNTER — HOSPITAL ENCOUNTER (OUTPATIENT)
Dept: RADIOLOGY | Facility: HOSPITAL | Age: 74
Discharge: HOME | End: 2024-08-05
Attending: FAMILY MEDICINE
Payer: MEDICARE

## 2024-08-05 DIAGNOSIS — R22.9 MASS OF SUBCUTANEOUS TISSUE: ICD-10-CM

## 2024-08-05 PROCEDURE — 76536 US EXAM OF HEAD AND NECK: CPT | Mod: 26,,, | Performed by: RADIOLOGY

## 2024-08-05 PROCEDURE — 76536 US EXAM OF HEAD AND NECK: CPT | Mod: TC

## 2024-09-05 ENCOUNTER — OFFICE VISIT (OUTPATIENT)
Dept: OTOLARYNGOLOGY | Facility: CLINIC | Age: 74
End: 2024-09-05
Payer: MEDICARE

## 2024-09-05 VITALS — BODY MASS INDEX: 32.18 KG/M2 | HEIGHT: 68 IN | WEIGHT: 212.31 LBS

## 2024-09-05 DIAGNOSIS — H60.60 CHRONIC OTITIS EXTERNA, UNSPECIFIED LATERALITY, UNSPECIFIED TYPE: Primary | ICD-10-CM

## 2024-09-05 PROCEDURE — 1159F MED LIST DOCD IN RCRD: CPT | Mod: CPTII,S$GLB,, | Performed by: OTOLARYNGOLOGY

## 2024-09-05 PROCEDURE — 3288F FALL RISK ASSESSMENT DOCD: CPT | Mod: CPTII,S$GLB,, | Performed by: OTOLARYNGOLOGY

## 2024-09-05 PROCEDURE — 99213 OFFICE O/P EST LOW 20 MIN: CPT | Mod: S$GLB,,, | Performed by: OTOLARYNGOLOGY

## 2024-09-05 PROCEDURE — 99999 PR PBB SHADOW E&M-EST. PATIENT-LVL III: CPT | Mod: PBBFAC,,, | Performed by: OTOLARYNGOLOGY

## 2024-09-05 PROCEDURE — 1126F AMNT PAIN NOTED NONE PRSNT: CPT | Mod: CPTII,S$GLB,, | Performed by: OTOLARYNGOLOGY

## 2024-09-05 PROCEDURE — 3061F NEG MICROALBUMINURIA REV: CPT | Mod: CPTII,S$GLB,, | Performed by: OTOLARYNGOLOGY

## 2024-09-05 PROCEDURE — 3066F NEPHROPATHY DOC TX: CPT | Mod: CPTII,S$GLB,, | Performed by: OTOLARYNGOLOGY

## 2024-09-05 PROCEDURE — 1101F PT FALLS ASSESS-DOCD LE1/YR: CPT | Mod: CPTII,S$GLB,, | Performed by: OTOLARYNGOLOGY

## 2024-09-05 PROCEDURE — 1160F RVW MEDS BY RX/DR IN RCRD: CPT | Mod: CPTII,S$GLB,, | Performed by: OTOLARYNGOLOGY

## 2024-09-05 PROCEDURE — 3044F HG A1C LEVEL LT 7.0%: CPT | Mod: CPTII,S$GLB,, | Performed by: OTOLARYNGOLOGY

## 2024-09-05 RX ORDER — NEOMYCIN SULFATE, POLYMYXIN B SULFATE AND DEXAMETHASONE 3.5; 10000; 1 MG/ML; [USP'U]/ML; MG/ML
SUSPENSION/ DROPS OPHTHALMIC
Qty: 10 ML | Refills: 3 | Status: SHIPPED | OUTPATIENT
Start: 2024-09-05

## 2024-09-05 NOTE — PROGRESS NOTES
Subjective:       Patient ID: Betzy Ortega is a 74 y.o. male.    Chief Complaint: Ear Drainage (Pt c/o right ear drainage and water behind left ear. Pt also c/o his sinuses draining. )      I saw this gentleman last year with the external otitis gave him Maxitrol drops it tells me they worked but the problem recurred shortly after the using the drops and he simply has not returned for reconsideration          Objective:      ENT Physical Exam    Has not obvious external otitis on the right that is a mild-to-moderate severity with a film that was easy to wipe away with a Q-tip.        Assessment:       1. Chronic otitis externa, unspecified laterality, unspecified type         Plan:          Since he got relief with the drops almost a year ago but the relief was incomplete in short-lived I think he simply needs to redo that but for a longer period of time we did discuss using a powder that includes antifungal medications in if this does not completely clear his issue or if it does not stay in good condition I am going to refer him to the compounding pharmacy for insufflated powder.

## 2024-09-19 RX ORDER — ROSUVASTATIN CALCIUM 40 MG/1
40 TABLET, COATED ORAL DAILY
Qty: 90 TABLET | Refills: 3 | Status: SHIPPED | OUTPATIENT
Start: 2024-09-19

## 2024-09-19 NOTE — TELEPHONE ENCOUNTER
Refill Routing Note   Medication(s) are not appropriate for processing by [facility] for the following reason(s):        No active prescription written by provider    [facility] action(s):  Defer             Appointments  past 12m or future 3m with PCP    Date Provider   Last Visit   8/2/2024 Mariam Mckeon MD   Next Visit   1/31/2025 Mariam Mckeon MD   ED visits in past 90 days: 0        Note composed:9:48 AM 09/19/2024

## 2024-09-19 NOTE — TELEPHONE ENCOUNTER
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  Started around 7 or 8 PM while at rest.  Started in his upper abdomen and then started to radiate around to the entire belly.  He feels very bloated.  He is felt nauseous but has not vomited.  His last bowel movement was today and was normal without blood.  No urinary symptoms.  No fevers, chest pain, shortness of breath.      Allergies:   Allergies   Allergen Reactions    Iodine RASH    Nsaids GI UPSET    Toradol Other (See Comments)         Physical Exam     ED Triage Vitals [03/07/24 0034]   ED Triage Vitals Group      Temp 97.3 °F (36.3 °C)      Heart Rate (!) 136      Resp 20      BP (!) 173/105      SpO2 97 %      EtCO2 mmHg       Height       Weight 222 lb 10.6 oz (101 kg)      Weight Scale Used Standing scale      BMI (Calculated)       IBW/kg (Calculated)        General: Very uncomfortable appearing, nontoxic  Head: Head atraumatic, normocephalic.  Eyes: Extraocular motions intact, conjunctiva normal  Cardiovascular: No tachycardia. Symmetric dorsalis pedis pulses.   Respiratory: No respiratory distress.  No stridor.  Abdominal: Abdomen  is soft.  Protuberant, nonperitonitic.  Generalized tenderness to palpation.  Neurologic: Alert. No facial asymmetry. Moves extremities spontaneously.   Skin: No rash, pallor.   Extremities: No lower extremity swelling or wounds.     Medical Decision Making and ED Course     Patient's chart was reviewed by me.     42-year-old male presents for evaluation of abdominal pain.  Per  Unable to retrieve patient chart and identify care due.  [facility] Embedded Care Due Messages. Reference number: [id number].   9/19/2024 9:23:01 AM CDT   chart review, patient has frequent ER visits for similar symptoms.  Today he appears extremely uncomfortable and has generalized tenderness to palpation.  Morphine reportedly helped slightly but patient is still having discomfort.  Will try droperidol for pain relief and nausea control.  Labs thus far show no leukocytosis or electrolyte derangements.  Lactic and inflammatory markers are in process.  CT abdomen pelvis ordered given patient's pain to evaluate for obstruction versus other intra-abdominal process.    .  ED Course as of 03/07/24 0641   Thu Mar 07, 2024   0216 Labs overall reassuring, interpreted by me.  Lipase within normal limits, ESR within normal limits, lactate normal.  No leukocytosis. [RZ]   0308 IMPRESSION:  1.   Minimal mucosal enhancement of the terminal ileum without wall  thickening or surrounding inflammation consistent with known Crohn's  disease. Low-grade active mucosal inflammation difficult to entirely  exclude.  2.   Otherwise, no acute finding in the abdomen or pelvis.  3.   Hepatic steatosis. [NP]   0311 CT imaging without any acute intra-abdominal process aside from mucosal enhancement of the terminal ileum, consistent with patient's known Crohn's disease. [RZ]   0314 Patient reevaluated, denies any complaints.  Feeling much improved. [RZ]      ED Course User Index  [NP] Magdalena Kelly  [RZ] Gricelda Medellin MD            Lab Results     Results for orders placed or performed during the hospital encounter of 03/07/24   Lipase   Result Value Ref Range    Lipase 33 15 - 77 Units/L   CBC with Automated Differential (performable only)   Result Value Ref Range    WBC 6.7 4.2 - 11.0 K/mcL    RBC 4.75 4.50 - 5.90 mil/mcL    HGB 14.5 13.0 - 17.0 g/dL    HCT 41.4 39.0 - 51.0 %    MCV 87.2 78.0 - 100.0 fl    MCH 30.5 26.0 - 34.0 pg    MCHC 35.0 32.0 - 36.5 g/dL    RDW-CV 12.8 11.0 - 15.0 %    RDW-SD 40.1 39.0 - 50.0 fL     140 - 450 K/mcL    NRBC 0 <=0 /100 WBC    Neutrophil, Percent  49 %    Lymphocytes, Percent 36 %    Mono, Percent 11 %    Eosinophils, Percent 4 %    Basophils, Percent 0 %    Immature Granulocytes 0 %    Absolute Neutrophils 3.2 1.8 - 7.7 K/mcL    Absolute Lymphocytes 2.4 1.0 - 4.8 K/mcL    Absolute Monocytes 0.8 0.3 - 0.9 K/mcL    Absolute Eosinophils  0.2 0.0 - 0.5 K/mcL    Absolute Basophils 0.0 0.0 - 0.3 K/mcL    Absolute Immature Granulocytes 0.0 0.0 - 0.2 K/mcL       Laboratory results were reviewed by me.    Radiology Results     Imaging Results    None         ED Medication Orders (From admission, onward)      Ordered Start     Status Ordering Provider    03/07/24 0054 03/07/24 0055  sodium chloride (NORMAL SALINE) 0.9 % bolus 1,000 mL  ONCE         Last MAR action: New Bag ALIDA MARTIN    03/07/24 0053 03/07/24 0054  morphine injection 4 mg  ONCE         Last MAR action: Given ALIDA MARTIN    03/07/24 0053 03/07/24 0054  ondansetron (ZOFRAN) injection 4 mg  ONCE         Last MAR action: Given ALIDA MARTIN            Clinical Impression     No diagnosis found.    Disposition        There is no disposition no dispo time  There is no comment    I discussed the workup and results with the patient.  Follow-up questions were answered and patient is comfortable with this plan of care.      Magdalena Kelly  Emergency Medicine PGY3  Patient seen with Attending Physician               Magdalena Kelly  Resident  03/07/24 1590

## 2024-09-19 NOTE — TELEPHONE ENCOUNTER
----- Message from Angelita Werner sent at 9/19/2024  9:05 AM CDT -----  Type:  RX Refill Request    Who Called:  pt   Refill or New Rx:  refill  RX Name and Strength:  rosuvastatin (CRESTOR) 40 MG Tab  How is the patient currently taking it? (ex. 1XDay):  as direct   Is this a 30 day or 90 day RX:  90  Preferred Pharmacy with phone number:    [id number] [place] [id number] AT [facility] [address]  [address] [place] [address]  Phone: [phone] Fax: [phone]      Local or Mail Order:  local   Ordering Provider:  vashti Regalado Call Back Number:  [phone] (home)     Additional Information:  please advise

## 2024-10-25 RX ORDER — VALACYCLOVIR HYDROCHLORIDE 1 G/1
1000 TABLET, FILM COATED ORAL EVERY 8 HOURS
Qty: 30 TABLET | Refills: 0 | Status: SHIPPED | OUTPATIENT
Start: 2024-10-25

## 2024-10-25 NOTE — TELEPHONE ENCOUNTER
----- Message from Alvin sent at 10/25/2024  8:56 AM CDT -----  Regarding: refill  Contact: patient  Type:  RX Refill Request    Who Called: patient  Refill or New Rx:refill  RX Name and Strength:valACYclovir (VALTREX) 1000 MG tablet  How is the patient currently taking it? (ex. 1XDay):  Is this a 30 day or 90 day RX:  Preferred Pharmacy with phone number:  [id number] [place] [address] AT [facility] [address]  [address] [address]  Phone: [phone] Fax: [phone]  Local or Mail Order:local  Ordering Provider:Mike  Would the patient rather a call back or a response via [facility]? refill  Best Call Back Number:[phone]  Additional Information: patient is out of medication

## 2024-10-25 NOTE — TELEPHONE ENCOUNTER
No care due was identified.  [facility] Embedded Care Due Messages. Reference number: [id number].   10/25/2024 9:02:38 AM CDT

## 2024-12-03 DIAGNOSIS — I10 ESSENTIAL HYPERTENSION: ICD-10-CM

## 2024-12-03 DIAGNOSIS — G47.00 INSOMNIA, UNSPECIFIED TYPE: ICD-10-CM

## 2024-12-03 NOTE — TELEPHONE ENCOUNTER
No care due was identified.  Health [facility] Embedded Care Due Messages. Reference number: [id number].   12/03/2024 10:57:52 AM CST

## 2024-12-03 NOTE — TELEPHONE ENCOUNTER
----- Message from Jose Luis sent at 12/3/2024 10:53 AM CST -----  Regarding: refill  Type:  RX Refill Request    Who Called: pt    Refill or New Rx:refill    RX Name and Strength:amLODIPine (NORVASC) 2.5 MG [id number] - Route: Take 1 tablet (2.5 mg total) by mouth once daily. - OralSent to pharmacy as: amLODIPine (NORVASC) 2.5 MG tabletNotes to Pharmacy: .E-Prescribing Status: Receipt confirmed by pharmacy (10/17/2023 10:49 AM CDT)     pantoprazole (PROTONIX) 40 MG tablet 90 tablet 3 10/17/2023 -   Sig - Route: Take 1 tablet (40 mg total) by mouth once daily. - Oral   Sent to pharmacy as: pantoprazole (PROTONIX) 40 MG tablet   E-Prescribing Status: Receipt confirmed by pharmacy (10/17/2023 10:38 AM CDT)     tamsulosin (FLOMAX) 0.4 mg Cap 90 capsule 1 5/9/2019 -   Sig: tamsulosin 0.4 mg capsule   TAKE 1 CAPSULE BY MOUTH EVERYDAY   Class: Print     traZODone (DESYREL) 100 MG tablet 180 tablet 3 9/5/2023 -   Sig - Route: Take 2 tablets (200 mg total) by mouth once daily. - Oral   Sent to pharmacy as: traZODone (DESYREL) 100 MG tablet   E-Prescribing Status: Receipt confirmed by pharmacy (9/5/2023 12:06 PM CDT)         How is the patient currently taking it? (ex. 1XDay):see above    Is this a 30 day or 90 day RX:see above    Preferred Pharmacy with phone number:    [id number] - [place], [place] - [address] [facility] [address]  [address]  [place] [address]  Phone: [phone] Fax: [phone]      Local or Mail Order:local     Ordering Provider:vashti Regalado Call Back Number:[phone]      Additional Information: pt st that he is out of meds, and wants a 90 day fill on all.  Please call to discuss.

## 2024-12-04 RX ORDER — TRAZODONE HYDROCHLORIDE 100 MG/1
200 TABLET ORAL DAILY
Qty: 180 TABLET | Refills: 1 | Status: SHIPPED | OUTPATIENT
Start: 2024-12-04

## 2024-12-04 RX ORDER — AMLODIPINE BESYLATE 2.5 MG/1
2.5 TABLET ORAL DAILY
Qty: 90 TABLET | Refills: 1 | Status: SHIPPED | OUTPATIENT
Start: 2024-12-04

## 2024-12-04 RX ORDER — PANTOPRAZOLE SODIUM 40 MG/1
40 TABLET, DELAYED RELEASE ORAL DAILY
Qty: 90 TABLET | Refills: 1 | Status: SHIPPED | OUTPATIENT
Start: 2024-12-04

## 2024-12-04 NOTE — TELEPHONE ENCOUNTER
Refill Routing Note   Medication(s) are not appropriate for processing by [facility] for the following reason(s):        No active prescription written by provider    [facility] action(s):  Defer      Medication Therapy Plan: Last ordered: 10/17/23  by Anthony Reyes MD. Patient recently changed PCP. Recent OV but no curent order by PCP      Appointments  past 12m or future 3m with PCP    Date Provider   Last Visit   8/2/2024 Mariam Mckeon MD   Next Visit   1/31/2025 Mariam Mckeon MD   ED visits in past 90 days: 0        Note composed:10:08 PM 12/03/2024

## 2025-01-30 ENCOUNTER — TELEPHONE (OUTPATIENT)
Dept: FAMILY MEDICINE | Facility: CLINIC | Age: 75
End: 2025-01-30
Payer: MEDICARE

## 2025-01-31 ENCOUNTER — OFFICE VISIT (OUTPATIENT)
Dept: FAMILY MEDICINE | Facility: CLINIC | Age: 75
End: 2025-01-31
Payer: MEDICARE

## 2025-01-31 ENCOUNTER — HOSPITAL ENCOUNTER (OUTPATIENT)
Dept: CARDIOLOGY | Facility: HOSPITAL | Age: 75
Discharge: HOME | End: 2025-01-31
Attending: FAMILY MEDICINE
Payer: MEDICARE

## 2025-01-31 VITALS
RESPIRATION RATE: 14 BRPM | HEART RATE: 72 BPM | HEIGHT: 68 IN | OXYGEN SATURATION: 97 % | BODY MASS INDEX: 31.89 KG/M2 | SYSTOLIC BLOOD PRESSURE: 128 MMHG | WEIGHT: 210.38 LBS | DIASTOLIC BLOOD PRESSURE: 64 MMHG

## 2025-01-31 DIAGNOSIS — R79.9 ABNORMAL FINDING OF BLOOD CHEMISTRY, UNSPECIFIED: ICD-10-CM

## 2025-01-31 DIAGNOSIS — D69.6 THROMBOCYTOPENIA: ICD-10-CM

## 2025-01-31 DIAGNOSIS — I10 ESSENTIAL HYPERTENSION: Primary | ICD-10-CM

## 2025-01-31 DIAGNOSIS — N18.31 STAGE 3A CHRONIC KIDNEY DISEASE: ICD-10-CM

## 2025-01-31 DIAGNOSIS — Z79.899 MEDICATION MANAGEMENT: ICD-10-CM

## 2025-01-31 DIAGNOSIS — G90.1 DYSAUTONOMIA: ICD-10-CM

## 2025-01-31 DIAGNOSIS — E55.9 VITAMIN D DEFICIENCY, UNSPECIFIED: ICD-10-CM

## 2025-01-31 DIAGNOSIS — Z13.6 ENCOUNTER FOR LIPID SCREENING FOR CARDIOVASCULAR DISEASE: ICD-10-CM

## 2025-01-31 DIAGNOSIS — Z12.5 SCREENING FOR PROSTATE CANCER: ICD-10-CM

## 2025-01-31 DIAGNOSIS — R94.6 ABNORMAL THYROID FUNCTION TEST: ICD-10-CM

## 2025-01-31 DIAGNOSIS — F51.01 PRIMARY INSOMNIA: ICD-10-CM

## 2025-01-31 DIAGNOSIS — R07.89 OTHER CHEST PAIN: ICD-10-CM

## 2025-01-31 DIAGNOSIS — F41.9 ANXIETY: ICD-10-CM

## 2025-01-31 DIAGNOSIS — R79.89 LOW SERUM VITAMIN D: ICD-10-CM

## 2025-01-31 DIAGNOSIS — Z79.899 OTHER LONG TERM (CURRENT) DRUG THERAPY: ICD-10-CM

## 2025-01-31 DIAGNOSIS — Z00.00 ROUTINE MEDICAL EXAM: ICD-10-CM

## 2025-01-31 DIAGNOSIS — N40.0 BENIGN PROSTATIC HYPERPLASIA, UNSPECIFIED WHETHER LOWER URINARY TRACT SYMPTOMS PRESENT: ICD-10-CM

## 2025-01-31 DIAGNOSIS — Z13.220 ENCOUNTER FOR LIPID SCREENING FOR CARDIOVASCULAR DISEASE: ICD-10-CM

## 2025-01-31 LAB
OHS QRS DURATION: 96 MS
OHS QTC CALCULATION: 450 MS

## 2025-01-31 PROCEDURE — G2211 COMPLEX E/M VISIT ADD ON: HCPCS | Mod: S$GLB,,, | Performed by: FAMILY MEDICINE

## 2025-01-31 PROCEDURE — 3074F SYST BP LT 130 MM HG: CPT | Mod: CPTII,S$GLB,, | Performed by: FAMILY MEDICINE

## 2025-01-31 PROCEDURE — 3078F DIAST BP <80 MM HG: CPT | Mod: CPTII,S$GLB,, | Performed by: FAMILY MEDICINE

## 2025-01-31 PROCEDURE — 80305 DRUG TEST PRSMV DIR OPT OBS: CPT | Mod: QW,S$GLB,, | Performed by: FAMILY MEDICINE

## 2025-01-31 PROCEDURE — 1126F AMNT PAIN NOTED NONE PRSNT: CPT | Mod: CPTII,S$GLB,, | Performed by: FAMILY MEDICINE

## 2025-01-31 PROCEDURE — 93005 ELECTROCARDIOGRAM TRACING: CPT

## 2025-01-31 PROCEDURE — 3288F FALL RISK ASSESSMENT DOCD: CPT | Mod: CPTII,S$GLB,, | Performed by: FAMILY MEDICINE

## 2025-01-31 PROCEDURE — 1101F PT FALLS ASSESS-DOCD LE1/YR: CPT | Mod: CPTII,S$GLB,, | Performed by: FAMILY MEDICINE

## 2025-01-31 PROCEDURE — 99215 OFFICE O/P EST HI 40 MIN: CPT | Mod: S$GLB,,, | Performed by: FAMILY MEDICINE

## 2025-01-31 PROCEDURE — 1159F MED LIST DOCD IN RCRD: CPT | Mod: CPTII,S$GLB,, | Performed by: FAMILY MEDICINE

## 2025-01-31 PROCEDURE — 99999 PR PBB SHADOW E&M-EST. PATIENT-LVL IV: CPT | Mod: PBBFAC,,, | Performed by: FAMILY MEDICINE

## 2025-01-31 PROCEDURE — 93010 ELECTROCARDIOGRAM REPORT: CPT | Mod: ,,, | Performed by: INTERNAL MEDICINE

## 2025-01-31 RX ORDER — DOXEPIN HYDROCHLORIDE 10 MG/1
10 CAPSULE ORAL NIGHTLY
Qty: 90 CAPSULE | Refills: 1 | Status: SHIPPED | OUTPATIENT
Start: 2025-01-31

## 2025-01-31 RX ORDER — ALPRAZOLAM 0.5 MG/1
0.5 TABLET ORAL 2 TIMES DAILY PRN
Qty: 60 TABLET | Refills: 2 | Status: SHIPPED | OUTPATIENT
Start: 2025-01-31 | End: 2025-01-31

## 2025-01-31 RX ORDER — TAMSULOSIN HYDROCHLORIDE 0.4 MG/1
CAPSULE ORAL
Qty: 90 CAPSULE | Refills: 3 | Status: SHIPPED | OUTPATIENT
Start: 2025-01-31

## 2025-01-31 RX ORDER — ALPRAZOLAM 0.5 MG/1
0.5 TABLET ORAL 2 TIMES DAILY PRN
Qty: 90 TABLET | Refills: 0 | Status: SHIPPED | OUTPATIENT
Start: 2025-01-31

## 2025-01-31 RX ORDER — DAPSONE 25 MG/1
25 TABLET ORAL DAILY
Qty: 90 TABLET | Refills: 3 | Status: SHIPPED | OUTPATIENT
Start: 2025-01-31

## 2025-01-31 RX ORDER — BUSPIRONE HYDROCHLORIDE 30 MG/1
30 TABLET ORAL 2 TIMES DAILY
Qty: 180 TABLET | Refills: 3 | Status: SHIPPED | OUTPATIENT
Start: 2025-01-31

## 2025-01-31 NOTE — PROGRESS NOTES
"Patient ID: Betzy Ortega is a 74 y.o. male.    Chief Complaint: Dizziness (Off and on for 2 years, has been experiencing more frequent "bad spells" latelty)    History of Present Illness    CHIEF COMPLAINT:  Betzy presents today with dizziness and lightheadedness    HISTORY OF PRESENT ILLNESS:  He reports a 20-year history of dysautonomia. He checks blood pressure at home when feeling weak and reports readings are usually normal. He experiences fatigue and burning sensation in arms during showers, requiring rest periods to complete bathing. He also experiences intermittent right-sided chest pain that has been severe enough to nearly prompt emergency room visits on multiple occasions.    MEDICAL HISTORY:  He has a history of aneurysm and acute kidney failure in 2008. He has herpes with occasional outbreaks. He also has dermatitis herpetiformis, developing pruritic bumps on elbows within days of missing medication.    GENITOURINARY:  He reports new onset of nocturia over the past 3-4 months. He is able to maintain an erection for a long time but is unable to achieve orgasm.    LABS:  Autoimmune panel was negative, PSA 1.4, and thyroid function tests were within normal limits. A1C was 4.7. Vitamin D level was 38, borderline low-normal. Creatinine has shown improvement from 49.8 in January 2022 to 48.9 in June 2023, with most recent value increasing to 52.4.      ROS:  ROS as indicated in HPI.         Physical Exam    General: No acute distress. Well-developed. Well-nourished.  Eyes: EOMI. Sclerae anicteric.  HENT: Normocephalic. Atraumatic. Nares patent. Cardiovascular: Regular rate. Regular rhythm. No murmurs. No rubs. No gallops. Normal S1, S2.  Respiratory: Normal respiratory effort. Clear to auscultation bilaterally. No rales. No rhonchi. No wheezing.  Musculoskeletal: No  obvious deformity.  Extremities: No lower extremity edema.  Neurological: Alert & oriented x3. No slurred speech. Normal gait.  Psychiatric: " Normal mood. Normal affect. Good insight. Good judgment.  Skin: Warm. Dry. No rash.         Assessment & Plan    IMPRESSION:  - Evaluated dizziness and weakness; suspected postural hypotension  - Considered dysautonomia as potential cause for reported symptoms  - Assessed kidney function; noted improvement in GFR  - Evaluated chest pain and arm fatigue during showering; recommended cardiac workup  - Reviewed medication regimen, including recent discontinuation of Xanax  - Considered restarting Xanax for anxiety management, weighing benefits against risks of dizziness    DYSAUTONOMIA:  - Discussed the nature of dysautonomia with the patient, noting their 20-year history with the condition.  - Evaluated reported symptoms, including dizziness when standing up (consistent with postural hypotension) and new symptoms of arm burning and fatigue during showers, which may indicate progression.  - Explained that dysautonomia affects blood pressure receptors, causing issues like postural hypotension, and that its severity can vary among individuals.  - Acknowledged that the patient's dizziness is likely connected to dysautonomia and that the condition may be worsening.  - Referred the patient to Dr. Chicas, cardiologist, for reevaluation of symptoms and aneurysm.    STAGE 3A CHRONIC KIDNEY DISEASE:  - Reviewed the patient's GFR (glomerular filtration rate) history: 49.8 in 2020, 48.9 in June 2023, and current improvement to 52.4.  - Noted the patient's history of acute kidney failure in 2008.  - Emphasized the importance of maintaining GFR above 60 mL per minute for optimal kidney function.  - Emphasized proper hydration for kidney health, recommending increased water intake over caffeinated beverages to avoid dehydration.  - Ordered a CMP to be completed within 6 months.    POSTURAL HYPOTENSION:  - Recommend signing up for a digital medicine program to monitor blood pressure, in addition to patient's current practice of  checking blood pressure when feeling weak.    HERPES:  - Prescribed valacyclovir (30 tablets, no refills) for occasional herpes outbreaks.  - Advised patient to keep medication on hand for immediate treatment when needed.    HYPERLIPIDEMIA:  - Continue Rosuvastatin 40 mg daily through September.  - Ordered cholesterol panel within 6 months.  - Current cholesterol levels described as 'fabulous'.    ANEURYSM:  - Noted patient sees a vascular specialist for aneurysm management.    SEXUAL DYSFUNCTION:  - Betzy reports difficulty achieving orgasm despite maintaining erections.  - Suggested reviewing medications as a potential cause.    CHEST PAIN:  - Ordered EKG to be performed today.  - Betzy reports occasional right-sided chest pain.  - Physical exam revealed normal heart sounds.  - Clarified purpose and limitations of EKG in cardiac evaluation.  - Primary diagnosis: evaluate for chest pain.    DERMATITIS HERPETIFORMIS:  - Continue Dapsone through June for treatment.  - Betzy reports skin condition affecting elbows with itchy bumps.    MEDICATIONS/SUPPLEMENTS:  - Restarted Xanax for anxiety (as needed).  - Refilled Buspar and Doxepin 10 mg at night.  - Continued Trazodone through December, Seroquel (twice daily), Protonix, vitamin D supplementation, and Amlodipine.    LABS:  - Ordered within 6 months: Complete blood count (CBC), thyroid function tests, vitamin D level, PSA, and urine drug screen.    FOLLOW UP:  - Schedule follow-up visit in 6 months (around August).  - Complete ordered labs before next appointment.  - Contact office for medication refills as needed.         Plan:          Essential hypertension  -     Microalbumin/Creatinine Ratio, Urine; Future; Expected date: 07/31/2025  -     CBC Auto Differential; Future; Expected date: 07/31/2025  -     Comprehensive Metabolic Panel; Future; Expected date: 07/31/2025  -     TSH; Future; Expected date: 07/31/2025    Dysautonomia  -     Hypertension Digital  Medicine ([facility]) Enrollment Order  -     SCHEDULED EKG 12-LEAD (to Muse); Future  -     Ambulatory referral/consult to Cardiology; Future; Expected date: 02/07/2025    Stage 3a chronic kidney disease  -     Microalbumin/Creatinine Ratio, Urine; Future; Expected date: 07/31/2025  -     CBC Auto Differential; Future; Expected date: 07/31/2025  -     Comprehensive Metabolic Panel; Future; Expected date: 07/31/2025    Anxiety  -     Discontinue: ALPRAZolam (XANAX) 0.5 MG tablet; Take 1 tablet (0.5 mg total) by mouth 2 (two) times daily as needed for Anxiety.  Dispense: 60 tablet; Refill: 2  -     TSH; Future; Expected date: 07/31/2025  -     doxepin (SINEQUAN) 10 MG capsule; Take 1 capsule (10 mg total) by mouth every evening.  Dispense: 90 capsule; Refill: 1  -     busPIRone (BUSPAR) 30 MG Tab; Take 1 tablet (30 mg total) by mouth 2 (two) times daily.  Dispense: 180 tablet; Refill: 3  -     ALPRAZolam (XANAX) 0.5 MG tablet; Take 1 tablet (0.5 mg total) by mouth 2 (two) times daily as needed for Anxiety.  Dispense: 90 tablet; Refill: 0    Low serum vitamin D  -     Vitamin D; Future; Expected date: 07/31/2025    Thrombocytopenia  -     CBC Auto Differential; Future; Expected date: 07/31/2025  -     dapsone 25 MG Tab; Take 1 tablet (25 mg total) by mouth once daily.  Dispense: 90 tablet; Refill: 3    Abnormal finding of blood chemistry, unspecified  -     Vitamin B12; Future; Expected date: 07/31/2025  -     Comprehensive Metabolic Panel; Future; Expected date: 07/31/2025  -     Hemoglobin A1C; Future; Expected date: 07/31/2025    Encounter for lipid screening for cardiovascular disease  -     Lipid Panel; Future; Expected date: 07/31/2025    Screening for prostate cancer  -     PSA, Screening; Future; Expected date: 07/31/2025    Abnormal thyroid function test  -     TSH; Future; Expected date: 07/31/2025    Routine medical exam  -     Microalbumin/Creatinine Ratio, Urine; Future; Expected date: 07/31/2025  -     Vitamin  D; Future; Expected date: 07/31/2025  -     Vitamin B12; Future; Expected date: 07/31/2025  -     Lipid Panel; Future; Expected date: 07/31/2025  -     Hemoglobin A1C; Future; Expected date: 07/31/2025  -     PSA, Screening; Future; Expected date: 07/31/2025    Vitamin D deficiency, unspecified  -     Vitamin D; Future; Expected date: 07/31/2025    Other long term (current) drug therapy  -     Vitamin B12; Future; Expected date: 07/31/2025    Primary insomnia  -     doxepin (SINEQUAN) 10 MG capsule; Take 1 capsule (10 mg total) by mouth every evening.  Dispense: 90 capsule; Refill: 1    Benign prostatic hyperplasia, unspecified whether lower urinary tract symptoms present  -     tamsulosin (FLOMAX) 0.4 mg Cap; tamsulosin 0.4 mg capsule   TAKE 1 CAPSULE BY MOUTH EVERYDAY  Dispense: 90 capsule; Refill: 3    Other chest pain        -occasional right-sided chest pain    Medication management  -     POCT Urine Drug Screen (With BUP)    In excessive of 40 minutes total time spent for evaluation and management services. Time included elements of the following: time spent preparing to see patient, obtaining and reviewing separately obtained history, exam, evaluation, counseling and education of patient/family member or care giver, documenting in the HMR, independently interpreting results and communication of results, coordination of care ordering medications, tests, or procedures, referral and communication to other health care professionals.      Follow up in about 3 months (around 4/30/2025).    This note was generated with the assistance of ambient listening technology. Verbal consent was obtained by the patient and accompanying visitor(s) for the recording of patient appointment to facilitate this note. I attest to having reviewed and edited the generated note for accuracy, though some syntax or spelling errors may persist. Please contact the author of this note for any clarification.

## 2025-02-03 ENCOUNTER — TELEPHONE (OUTPATIENT)
Dept: FAMILY MEDICINE | Facility: CLINIC | Age: 75
End: 2025-02-03
Payer: MEDICARE

## 2025-02-03 LAB
AMP AMPHETAMINE 1000 NM/ML POC: NEGATIVE
BAR BARBITURATES 300 NG/ML POC: NEGATIVE
BUP BUPRENORPHINE 10 NG/ML POC: NEGATIVE
BZO BENZODIAZEPINES 300 NG/ML POC: ABNORMAL
COC COCAINE 300 NG/ML POC: NEGATIVE
CREATININE (CR) POC: 50
CTP QC/QA: YES
MET METHAMPHETAMINE 1000 NG/ML POC: NEGATIVE
MOP/OPI300 MORPHINE 300 NG/ML POC: NEGATIVE
MTD METHADONE 300 NG/ML POC: NEGATIVE
OXIDANT (OX) POC: NEGATIVE
OXY OXYCODONE 100 NG/ML POC: NEGATIVE
SPECIFIC GRAVITY (SG) POC: 1.01
TEMPERATURE (°F) POC: 92
THC MARIJUANA 50 NG/ML POC: NEGATIVE

## 2025-02-03 NOTE — TELEPHONE ENCOUNTER
----- Message from Nica sent at 2/3/2025  8:33 AM CST -----  Type:  Needs Medical Advice    Who Called: patient    Would the patient rather a call back or a response via [facility]? Please call    Best Call Back Number: [phone]    Additional Information: Patient had a panic attack over the weekend and took a Xanax and wants to know if this will effect his urine test results if he goes this morning   Please call back to advise. Thanks!

## 2025-02-03 NOTE — TELEPHONE ENCOUNTER
Spoke with pt.   Pt reports anxiety attack, took xanax over weekend.   Informed pt MD prescribed 1/31/25, will show in his urine. Pt voiced understanding

## 2025-02-06 ENCOUNTER — TELEPHONE (OUTPATIENT)
Dept: FAMILY MEDICINE | Facility: CLINIC | Age: 75
End: 2025-02-06
Payer: MEDICARE

## 2025-02-06 DIAGNOSIS — F41.9 ANXIETY: ICD-10-CM

## 2025-02-06 NOTE — TELEPHONE ENCOUNTER
----- Message from Nica sent at 2/6/2025  8:49 AM CST -----  Type:  RX Refill Request    Who Called: Patient     Refill or New Rx:New     RX Name and Strength:Zanax     Is this a 30 day or 90 day RX:30     Preferred Pharmacy with phone number:   [id number], [place] - [address] [address] [facility] [address]  [address]  [facility] [address]  Phone: [phone] Fax: [phone]    Local or Mail Order:local     Ordering Provider:Mike      Would the patient rather a call back or a response via [facility]? Please call     Best Call Back Number: [phone]    Additional Information: Patient is unsure if he is to receive this medication and is a bit confused    Please call back to advise. Thanks!

## 2025-02-06 NOTE — TELEPHONE ENCOUNTER
Spoke with pt.   Informed pt Xanax sent into pharmacy.   [facility] out of stock.   Pt refused to have transferred to new pharmacy, will wait for [facility] to receive back in stock.

## 2025-02-24 ENCOUNTER — OFFICE VISIT (OUTPATIENT)
Dept: CARDIOLOGY | Facility: CLINIC | Age: 75
End: 2025-02-24
Payer: MEDICARE

## 2025-02-24 VITALS
DIASTOLIC BLOOD PRESSURE: 62 MMHG | WEIGHT: 213.13 LBS | HEIGHT: 68 IN | BODY MASS INDEX: 32.3 KG/M2 | OXYGEN SATURATION: 95 % | SYSTOLIC BLOOD PRESSURE: 95 MMHG | HEART RATE: 71 BPM

## 2025-02-24 DIAGNOSIS — G90.1 DYSAUTONOMIA: ICD-10-CM

## 2025-02-24 DIAGNOSIS — R06.09 DOE (DYSPNEA ON EXERTION): ICD-10-CM

## 2025-02-24 DIAGNOSIS — E65 ABDOMINAL OBESITY: ICD-10-CM

## 2025-02-24 DIAGNOSIS — Z82.0 FAMILY HISTORY OF SLEEP APNEA: ICD-10-CM

## 2025-02-24 DIAGNOSIS — Z78.9 EXCESSIVE CAFFEINE INTAKE: ICD-10-CM

## 2025-02-24 DIAGNOSIS — G47.19 EXCESSIVE DAYTIME SLEEPINESS: ICD-10-CM

## 2025-02-24 DIAGNOSIS — Z91.89 AT HIGH RISK FOR CARDIOVASCULAR DISEASE: ICD-10-CM

## 2025-02-24 DIAGNOSIS — G47.9 SLEEP DISORDER: ICD-10-CM

## 2025-02-24 DIAGNOSIS — I10 ESSENTIAL HYPERTENSION: ICD-10-CM

## 2025-02-24 DIAGNOSIS — I71.21 ANEURYSM OF ASCENDING AORTA WITHOUT RUPTURE: ICD-10-CM

## 2025-02-24 DIAGNOSIS — I95.2 HYPOTENSION DUE TO DRUGS: ICD-10-CM

## 2025-02-24 DIAGNOSIS — Z87.891 PAST HISTORY OF CHEWING TOBACCO USE: ICD-10-CM

## 2025-02-24 DIAGNOSIS — R07.89 INTERMITTENT RIGHT-SIDED CHEST PAIN: Primary | ICD-10-CM

## 2025-02-24 DIAGNOSIS — N18.31 STAGE 3A CHRONIC KIDNEY DISEASE: ICD-10-CM

## 2025-02-24 DIAGNOSIS — R53.1 SPELL OF GENERALIZED WEAKNESS: ICD-10-CM

## 2025-02-24 DIAGNOSIS — F41.1 GAD (GENERALIZED ANXIETY DISORDER): ICD-10-CM

## 2025-02-24 DIAGNOSIS — R06.83 LOUD SNORING: ICD-10-CM

## 2025-02-24 PROBLEM — I71.20 THORACIC AORTIC ANEURYSM (TAA): Status: ACTIVE | Noted: 2025-02-24

## 2025-02-24 LAB
OHS QRS DURATION: 86 MS
OHS QTC CALCULATION: 458 MS

## 2025-02-24 PROCEDURE — 1101F PT FALLS ASSESS-DOCD LE1/YR: CPT | Mod: CPTII,S$GLB,, | Performed by: INTERNAL MEDICINE

## 2025-02-24 PROCEDURE — 1159F MED LIST DOCD IN RCRD: CPT | Mod: CPTII,S$GLB,, | Performed by: INTERNAL MEDICINE

## 2025-02-24 PROCEDURE — 99999 PR PBB SHADOW E&M-EST. PATIENT-LVL IV: CPT | Mod: PBBFAC,,, | Performed by: INTERNAL MEDICINE

## 2025-02-24 PROCEDURE — 3288F FALL RISK ASSESSMENT DOCD: CPT | Mod: CPTII,S$GLB,, | Performed by: INTERNAL MEDICINE

## 2025-02-24 PROCEDURE — 1160F RVW MEDS BY RX/DR IN RCRD: CPT | Mod: CPTII,S$GLB,, | Performed by: INTERNAL MEDICINE

## 2025-02-24 PROCEDURE — 99205 OFFICE O/P NEW HI 60 MIN: CPT | Mod: S$GLB,,, | Performed by: INTERNAL MEDICINE

## 2025-02-24 PROCEDURE — 3074F SYST BP LT 130 MM HG: CPT | Mod: CPTII,S$GLB,, | Performed by: INTERNAL MEDICINE

## 2025-02-24 PROCEDURE — 3078F DIAST BP <80 MM HG: CPT | Mod: CPTII,S$GLB,, | Performed by: INTERNAL MEDICINE

## 2025-02-24 PROCEDURE — 3044F HG A1C LEVEL LT 7.0%: CPT | Mod: CPTII,S$GLB,, | Performed by: INTERNAL MEDICINE

## 2025-02-24 PROCEDURE — 1126F AMNT PAIN NOTED NONE PRSNT: CPT | Mod: CPTII,S$GLB,, | Performed by: INTERNAL MEDICINE

## 2025-02-24 NOTE — PROGRESS NOTES
Subjective:        Patient ID:  Betzy Ortega is a 74 y.o. male who presents for evaluation of Establish Care and Hypertension  PCP and referred by Mariam Mckeon MD   Prior cardiologist: Praneeth Liz MD, last seen 2020 for similar problem, no diagnosis and no Rx  Vascular: Dr. Love at [facility] was told of small aneurysm beside the heart in 2016, was re image q2 years but not for the past 3 years.    Lives alone, no pets, loves dog but restrict to only small pets where he lives.  Daughter, Kacie, in [facility], close  Retired training race horses.    Patient is a new patient to me.    Health literacy: medium  Vaccinations: up-to-date, incomplete COVID, last COVID infection in 2020, no residual  Activities:  able to be active but not due to laziness, no physical problem nor limitations.  Nicotine: former, quit 1/2024, dipped for 35 years.  Alcohol: no  Illicit drugs: No, Xanax prn, uses only every 2-3 weeks. Help with anxiety.   Cardiac symptoms:  right sided CP for the past month  Home BP: Yes, 120/60  Medication compliance: Yes, on 2.5 mg of amlodipine, none for 3 days and took this morning.  Diet: Regular, some salt  Caffeine: How much do you consume a day? 6-8 cpd, have sleep problem  Labs:   Sodium   Date Value Ref Range Status   01/31/2025 139 136 - 145 mmol/L Final     Potassium   Date Value Ref Range Status   01/31/2025 3.9 3.5 - 5.1 mmol/L Final     Chloride   Date Value Ref Range Status   01/31/2025 105 95 - 110 mmol/L Final     CO2   Date Value Ref Range Status   01/31/2025 25 23 - 29 mmol/L Final     Glucose   Date Value Ref Range Status   01/31/2025 100 70 - 110 mg/dL Final     BUN   Date Value Ref Range Status   01/31/2025 15 8 - 23 mg/dL Final     Creatinine   Date Value Ref Range Status   01/31/2025 1.6 (H) 0.5 - 1.4 mg/dL Final     Calcium   Date Value Ref Range Status   01/31/2025 9.3 8.7 - 10.5 mg/dL Final     Total Protein   Date Value Ref Range Status   01/31/2025 6.6 6.0 - 8.4 g/dL Final      Albumin   Date Value Ref Range Status   01/31/2025 3.8 3.5 - 5.2 g/dL Final     Total Bilirubin   Date Value Ref Range Status   01/31/2025 1.3 (H) 0.1 - 1.0 mg/dL Final     Comment:     For infants and newborns, interpretation of results should be based  on gestational age, weight and in agreement with clinical  observations.    Premature Infant recommended reference ranges:  Up to 24 hours.............<8.0 mg/dL  Up to 48 hours............<12.0 mg/dL  3-5 days..................<15.0 mg/dL  6-29 days.................<15.0 mg/dL       Alkaline Phosphatase   Date Value Ref Range Status   01/31/2025 54 40 - 150 U/L Final     AST   Date Value Ref Range Status   01/31/2025 13 10 - 40 U/L Final     ALT   Date Value Ref Range Status   01/31/2025 9 (L) 10 - 44 U/L Final     Anion Gap   Date Value Ref Range Status   01/31/2025 9 8 - 16 mmol/L Final     eGFR   Date Value Ref Range Status   01/31/2025 44.7 (A) >60 mL/min/1.73 m^2 Final      Lab Results   Component Value Date    WBC 4.38 01/31/2025    RBC 5.13 01/31/2025    HGB 14.3 01/31/2025    HCT 43.7 01/31/2025    MCV 85 01/31/2025    MCH 27.9 01/31/2025    MCHC 32.7 01/31/2025    RDW 12.5 01/31/2025     (L) 01/31/2025    MPV 11.1 01/31/2025    GRAN 3.3 01/31/2025    GRAN 75.3 (H) 01/31/2025    LYMPH 0.7 (L) 01/31/2025    LYMPH 16.7 (L) 01/31/2025    MONO 0.3 01/31/2025    MONO 7.1 01/31/2025    EOS 0.0 01/31/2025    BASO 0.02 01/31/2025    EOSINOPHIL 0.2 01/31/2025    BASOPHIL 0.5 01/31/2025      Lab Results   Component Value Date    TSH 1.493 01/31/2025     Lab Results   Component Value Date    HGBA1C 4.7 01/31/2025      Lab Results   Component Value Date    CHOL 98 (L) 01/31/2025    CHOL 103 (L) 08/01/2024    CHOL 174 10/31/2019     Lab Results   Component Value Date    HDL 43 01/31/2025    HDL 48 08/01/2024    HDL 45 10/31/2019     Lab Results   Component Value Date    LDLCALC 31.2 (L) 01/31/2025    LDLCALC 34.6 (L) 08/01/2024    LDLCALC 95.2 10/31/2019  "    Lab Results   Component Value Date    TRIG 119 01/31/2025    TRIG 102 08/01/2024    TRIG 169 (H) 10/31/2019       Lab Results   Component Value Date    CHOLHDL 43.9 01/31/2025    CHOLHDL 46.6 08/01/2024    CHOLHDL 25.9 10/31/2019     1/2025 urine negative for microalbuminuria    Last Echo: never  No results found for this or any previous visit.    Last stress test: 2008  No results found for this or any previous visit.     Cardiovascular angiogram: none  ECG:   Results for orders placed or performed in visit on 02/24/25   EKG 12-lead    Collection Time: 02/24/25  3:11 PM   Result Value Ref Range    QRS Duration 86 ms    OHS QTC Calculation 458 ms    Narrative    Test Reason :    Vent. Rate :  69 BPM     Atrial Rate :  69 BPM     P-R Int : 146 ms          QRS Dur :  86 ms      QT Int : 428 ms       P-R-T Axes :  43 -31  41 degrees    QTcB Int : 458 ms    Normal sinus rhythm  Left axis deviation  Abnormal ECG  When compared with ECG of 31-Jan-2025 12:07,  No significant change was found    Referred By: MARIAM MCKEON           Confirmed By:       Today: NER, rate 69, left axis    Fundoscopic exam: due     WM with long history of BOOKER and "dysautonomia for over 40 years, recall brief black-out when laughing or yell hard. Jeannette to moderate that without any bad event. Also report recurrent "weak spells" for the last 14+ years and BP and HR checked at time of symptom were normal. Again no physical injuries over this time. Had CV evaluation with Praneeth Liz MD for similar problem without diagnosis nor Rx. Worried about heart, (bundle branch block) and the small aneurysm, found by Dr. Love in 2016. Last CT was in 2022. No premature family history for CAD nor CVA. Overall ASCVD 10-year event risk was 24.1% in 10/2019. Now on Crestor with excellent LDL-C control. Also had stopped dipping tobacco in 1/2024.    Mariam Mckeon MD noted 1/31/2025 "CHIEF COMPLAINT:  Betzy presents today with dizziness and " "lightheadedness     HISTORY OF PRESENT ILLNESS:  He reports a 20-year history of dysautonomia. He checks blood pressure at home when feeling weak and reports readings are usually normal. He experiences fatigue and burning sensation in arms during showers, requiring rest periods to complete bathing. He also experiences intermittent right-sided chest pain that has been severe enough to nearly prompt emergency room visits on multiple occasions.    IMPRESSION:  - Evaluated dizziness and weakness; suspected postural hypotension  - Considered dysautonomia as potential cause for reported symptoms  - Assessed kidney function; noted improvement in GFR  - Evaluated chest pain and arm fatigue during showering; recommended cardiac workup  - Reviewed medication regimen, including recent discontinuation of Xanax  - Considered restarting Xanax for anxiety management, weighing benefits against risks of dizziness     DYSAUTONOMIA:  - Discussed the nature of dysautonomia with the patient, noting their 20-year history with the condition.  - Evaluated reported symptoms, including dizziness when standing up (consistent with postural hypotension) and new symptoms of arm burning and fatigue during showers, which may indicate progression.  - Explained that dysautonomia affects blood pressure receptors, causing issues like postural hypotension, and that its severity can vary among individuals.  - Acknowledged that the patient's dizziness is likely connected to dysautonomia and that the condition may be worsening.  - Referred the patient to Dr. Chicas, cardiologist, for reevaluation of symptoms and aneurysm.    VS - /64 (BP Location: Left arm, Patient Position: Sitting)     Pulse 72     Resp 14     Ht 5' 8" (1.727 m)     Wt 95.4 kg (210 lb 6.4 oz)     SpO2 97%     BMI 31.99 kg/m²     BSA 2.14 m²"    CXR 8/2024 - The heart size is normal.  There is no mediastinal abnormality.  There is chronic elevation of the right hemidiaphragm and mild " compressive atelectasis at the right lung base.  Otherwise, the lungs are clear.   CT abdomen and pelvis 1/2022 - Findings suspicious for acute appendicitis. No mention of the vascular structures.       Review of Systems   Constitutional: Positive for malaise/fatigue. Negative for diaphoresis, fever, night sweats and weight gain.   HENT:  Positive for ear discharge and tinnitus. Negative for nosebleeds.    Eyes:  Negative for visual disturbance.   Cardiovascular:  Positive for chest pain (right side) and dyspnea on exertion. Negative for claudication, cyanosis, irregular heartbeat, leg swelling, near-syncope, orthopnea, palpitations and paroxysmal nocturnal dyspnea.   Respiratory:  Positive for wheezing. Negative for cough, shortness of breath, sleep disturbances due to breathing and snoring.         [place] score 8, awaken tired, father and daughter have LIZZ. Decline testing.   Endocrine: Negative for polydipsia and polyuria.   Hematologic/Lymphatic: Bruises/bleeds easily.   Skin:  Positive for itching. Negative for color change, flushing, nail changes, poor wound healing and suspicious lesions.   Musculoskeletal:  Negative for arthritis, falls, gout, joint pain, joint swelling, muscle cramps, muscle weakness and myalgias.   Gastrointestinal:  Positive for constipation. Negative for heartburn, hematemesis, hematochezia, melena and nausea.   Neurological:  Positive for weakness (spells). Negative for disturbances in coordination, excessive daytime sleepiness, dizziness, focal weakness, headaches, light-headedness, loss of balance, numbness and vertigo.   Psychiatric/Behavioral:  Positive for depression. Negative for substance abuse. The patient is nervous/anxious. The patient does not have insomnia.         Objective:    Physical Exam  Constitutional:       Appearance: He is well-developed.      Comments: RA O2 sat 95%  Orthostatic BP: sitting 98/57, standing 95/62   HENT:      Head: Normocephalic.   Eyes:       "Conjunctiva/sclera: Conjunctivae normal.      Pupils: Pupils are equal, round, and reactive to light.   Neck:      Thyroid: No thyromegaly.      Vascular: No JVD.   Cardiovascular:      Rate and Rhythm: Normal rate and regular rhythm.      Pulses: Intact distal pulses.           Carotid pulses are 1+ on the right side and 1+ on the left side.       Radial pulses are 1+ on the right side and 1+ on the left side.        Dorsalis pedis pulses are 1+ on the right side and 1+ on the left side.        Posterior tibial pulses are 1+ on the right side and 1+ on the left side.      Heart sounds: Normal heart sounds. No murmur heard.     No friction rub. No gallop.   Pulmonary:      Effort: Pulmonary effort is normal.      Breath sounds: Normal breath sounds. No rales.   Chest:      Chest wall: No tenderness.   Abdominal:      General: Bowel sounds are normal.      Palpations: Abdomen is soft.      Tenderness: There is no abdominal tenderness.      Comments: Waist 45"   Musculoskeletal:         General: Normal range of motion.      Cervical back: Normal range of motion and neck supple.      Right lower leg: No edema.      Left lower leg: No edema.   Lymphadenopathy:      Cervical: No cervical adenopathy.   Skin:     General: Skin is warm and dry.      Findings: No rash.   Neurological:      Mental Status: He is alert and oriented to person, place, and time.           Assessment:       1. Past history of chewing tobacco use, quit 1/2024, dipped for 35 years    2. Dysautonomia    3. BOOKER (generalized anxiety disorder), started as teens    4. Hypotension due to drugs    5. Spell of generalized weakness, onset 1985    6. Sleep disorder    7. Excessive caffeine intake    8. Stage 3a chronic kidney disease    9. At high risk for cardiovascular disease, ASCVD 10-year event risk 24.1% in 10/2019    10. Aneurysm of ascending aorta without rupture    11. Excessive daytime sleepiness    12. Family history of sleep apnea    13. Loud " "snoring    14. RODRIGUEZ (dyspnea on exertion), onset 2020    15. Intermittent right-sided chest pain    16. Abdominal obesity         Plan:       Betzy GREGORIO" was seen today for establish care and hypertension.    Diagnoses and all orders for this visit:    Intermittent right-sided chest pain    Dysautonomia  -     Ambulatory referral/consult to Cardiology    Past history of chewing tobacco use, quit 1/2024, dipped for 35 years    BOOKER (generalized anxiety disorder), started as teens    Hypotension due to drugs    Spell of generalized weakness, onset 1985    Sleep disorder    Excessive caffeine intake    Stage 3a chronic kidney disease    At high risk for cardiovascular disease, ASCVD 10-year event risk 24.1% in 10/2019    Aneurysm of ascending aorta without rupture    Excessive daytime sleepiness    Family history of sleep apnea    Loud snoring    RODRIGUEZ (dyspnea on exertion), onset 2020    Abdominal obesity     - All medical issues reviewed, continue current Rx. Will return to vascular doctor for CTA.  - Warning signs of MI and stroke given, if symptoms last more than 5 minutes, stop immediately and call 911, then chew 2-4 low-dose ASA (81 mg).   - Need annual fundoscopic examination   - Consider use of Potassium chloride salt substitute, Morales Nu-Salt.   - Declined info on LIZZ  - CV status and all medications reviewed, patient acknowledge good understanding.  - Recommend healthy living: no nicotine, avoid alcohol, healthy diet and regular exercise aiming for fitness, restorative sleep and weight control  - Need good exercise program, 4 key elements: 1. Aerobic (walking, swimming, dancing, jogging, biking, etc, 2. Muscle strengthening / resistance exercise, need to do 2-3 times weekly, 3. Stretching daily, good stretch takes a whole  total minute. 4. Balance exercise daily.   - Encourage activities as much as tolerated. Any activity is better than none!   - Instruction for Mediterranean, high potassium diet and heart " healthy exercise given.  - Check home blood pressure, 2 days weekly, do 2 readings within 5 minutes in AM and PM, keep log for review. Target resting BP is less than 130/80.   - Weigh twice weekly, try to lose 1-2 lbs per week. Target weight loss of 5%-10%.  - Highly recommend 30-60 minutes of exercise / activities daily, can have Sunday off, with 2-3 sessions of muscle strengthening weekly. A  would be very helpful.  - Recommend at least annual cardiovascular evaluation in view of patient's significant risk factors.  - Phone review / encourage use of [facility]       Total time spend including review of record prior to face-to-face visit is 60 minutes. Greater than 50% of the time was spent in counseling and coordination of care. The above assessment and plan have been discussed at length. Referring provider's note reviewed. Labs and procedure over the last 6 months reviewed. Problem List updated. Asked to bring in all active medications / pills bottles with next visit. Will send note to referring / PCP.

## 2025-02-24 NOTE — PATIENT INSTRUCTIONS
Recommended Mediterranean dietEating Heart-Healthy Food: Using the DASH Plan  Eating for your heart doesnt have to be hard or boring. You just need to know how to make healthier choices. The DASH eating plan has been developed to help you do just that. DASH stands for Dietary Approaches to Stop Hypertension. It is a plan that has been proven to be healthier for your heart and to lower your risk for high blood pressure. It can also help lower your risk for cancer, heart disease, osteoporosis, and diabetes.  Choosing from Each Food Group  Choose foods from each of the food groups below each day. Try to get the recommended number of servings for each food group. The serving numbers are based on a diet of 2,000 calories a day. Talk to your doctor if youre unsure about your calorie needs.  Grains   Servings: 7-8 a day  A serving is:  1 slice bread  1 ounce dry cereal  half a cup cooked rice or pasta  Best choices: Whole grains and any grains high in fiber.  Vegetables   Servings: 4-5 a day  A serving is:  1 cup raw leafy vegetable  Half a cup cooked vegetable  Three-quarter cup vegetable juice  Best choices: Fresh or frozen vegetable prepared without too much added salt or fat.    Fruits   Servings: 4-5 a day  A serving is:  Three-quarter cup fruit juice  1 medium fruit  One-quarter cup dried fruit  One-half cup fresh, frozen, or canned fruit  Best choices: A variety of fresh fruits of different colors. Whole fruits are a much better choice than fruit juices.  Low-fat or Fat Free Dairy   Servings: 2-3 a day  A serving is:  8 ounces milk  1 cup yogurt  One and a half ounces cheese  Best choices: Skim or 1% milk, low-fat or fat free yogurt or buttermilk, and low-fat cheeses.       Meat, Poultry, Fish   Servings: 2 or fewer a day  A serving is:  3 ounces cooked meat, poultry, or fish  Best choices: Lean meats and fish. Trim away visible fat. Broil, roast, or boil instead of frying. Remove skin from poultry before eating.   Nuts, Seeds, Beans   Servings: 4-5 a week  A serving is:  One third cup nuts (or one and a half ounces)  2 tablespoons sunflower seeds  Half a cup cooked beans  Best choices: Dry roasted nuts with no salt added, lentils, kidney beans, garbanzo beans, and whole portillo beans.    Fats and Oils   Servings: 2 a day  A serving is:  1 teaspoon vegetable oil  1 teaspoon soft margarine  1 tablespoon low-fat mayonnaise  1 teaspoon regular mayonnaise  2 tablespoons light salad dressing  1 tablespoon regular salad dressing  Best choices: Monounsaturated and polyunsaturated fats such as olive, canola, or safflower oil.  Sweets   Servings: 5 a week or fewer  A serving is:  1 tablespoon sugar, maple syrup, or honey  1 tablespoon jam or jelly  1 half-ounce jelly beans (about 15)  8 ounces lemonade  Best choices: Dried fruit can be a satisfying sweet. Choose low-fat sweets when possible. And watch your serving sizes!       Aerobic Exercise for a Healthy Heart  Exercise is a lot more than an energy booster and a stress reliever. It also strengthens your heart muscle, lowers your blood pressure and blood cholesterol, and burns calories.      Remember, some activity is better than none.     Choose an Aerobic Activity  Choose a nonstop activity that makes your heart and lungs work harder than they do when you rest or walk normally. This aerobic exercise can improve the way your heart and other muscles use oxygen. Make it fun by exercising with a friend and choosing an activity you enjoy. Here are some ideas:  Walking  Swimming  Bicycling  Stair climbing  Dancing  Jogging  Exercise Regularly  If you havent been exercising regularly,  get your doctors okay first. Then start slowly.  Here are some tips:  Begin exercising 3 times a week for 5-10 minutes at a time.  When you feel comfortable, add a few minutes each week.  Slowly build up to exercising 3-4 times each week for 20-40 minutes. Aim for a total of 150 or more minutes a  week.  Be sure to carry your nitroglycerin with you when you exercise.  If you get angina when youre exercising, stop what youre doing, take your nitroglycerin, and call your doctor.  © 5112-6644 [employer], [address] [place] [address], [place] [address]. All rights reserved. This information is not intended as a substitute for professional medical care. Always follow your healthcare professional's instructions.    Losing Weight (Cardiovascular)  Excess weight is a major risk factor for heart disease. Losing weight may help keep your arteries open so that your heart can get the oxygen-rich blood it needs. Weight loss can also help lower your blood pressure and reduce your risk for diabetes. All in all, losing weight makes you healthier.          Exercise with a friend. When activity is fun, you're more likely to stick with it.        Calories and Weight Loss  Calories are the fuel your body burns for energy. You get the calories you need from the food you eat. For healthy weight loss, women should eat at least 1,200 calories a day, men at least 1,500.    When you eat more calories than you need, your body stores the extra calories as fat. One pound of fat equals 3,500 calories.    To lose weight, try to burn 500 calories a day more than you eat. To do this, eat 250 calories less each day. Add activity to burn the other 250 calories. Walking 21/2 miles burns about 250 calories.    Eat a variety of healthy foods. Its the best way to make calories count.     Tips for losing weight:  Drink 8 to 10 glasses of water a day.    Dont skip meals. Instead, eat smaller portions.       Brisk Activity Is Best  Brisk activity gets your heart pumping faster. It makes your heart healthier. Its also the best way to burn calories. In fact, your body may keep burning calories for hours after you stop a brisk activity.    Begin by walking 10 minutes most days.    Add more time and speed to your walk. Build up as you feel  able.    Try to walk briskly at least 30 minutes most days. If needed, you can break this into 2 shorter sessions.     Check off the ideas below that you could try to make your day more active:    Take the stairs instead of the elevator.    Park your car farther away and walk.    Ride a bike to work or to the store.    Walk laps around the mall.

## 2025-04-04 ENCOUNTER — TELEPHONE (OUTPATIENT)
Dept: FAMILY MEDICINE | Facility: CLINIC | Age: 75
End: 2025-04-04
Payer: MEDICARE

## 2025-04-04 NOTE — TELEPHONE ENCOUNTER
Spoke w/ pt   Pt reports diarrhea 3+ days, muscle cramps  Denied vomiting, fever  unable to eat or drink without having diarrhea   Pt tried Imodium, not effective. Recommended ER for evaluation.   Pt requesting MD recommendation

## 2025-04-04 NOTE — TELEPHONE ENCOUNTER
----- Message from Marti sent at 4/4/2025  1:10 PM CDT -----  Type:  Needs Medical AdviceWho Called: pt Symptoms (please be specific): diarrhea  How long has patient had these symptoms:  3 days Pharmacy name and phone #:  [id number] [place] [facility] [address] [place] [address] AT [address] [address] [id number]: [phone] Fax: [phone] the patient rather a call back or a response via [facility]? Call back Best Call Back Number: [phone] Additional Information:     Please call back to advise. Thank you.

## 2025-04-04 NOTE — TELEPHONE ENCOUNTER
Spoke with patient regarding being ill for 3 days. He was upset that Dr Mckeon had not gotten back to him yet. I explained to him that she was in clinic and I read to him the note in his chart from Brie. Patient stated that he must have not heard Brie recommend that he go to the ER for evaluation. Patient then stated that he would go to the ER. I voiced understanding and call ended.

## 2025-04-08 ENCOUNTER — OFFICE VISIT (OUTPATIENT)
Dept: OTOLARYNGOLOGY | Facility: CLINIC | Age: 75
End: 2025-04-08
Payer: MEDICARE

## 2025-04-08 VITALS — HEIGHT: 68 IN | BODY MASS INDEX: 32.31 KG/M2 | WEIGHT: 213.19 LBS

## 2025-04-08 DIAGNOSIS — H60.60 CHRONIC OTITIS EXTERNA, UNSPECIFIED LATERALITY, UNSPECIFIED TYPE: Primary | ICD-10-CM

## 2025-04-08 PROCEDURE — 1160F RVW MEDS BY RX/DR IN RCRD: CPT | Mod: CPTII,S$GLB,, | Performed by: OTOLARYNGOLOGY

## 2025-04-08 PROCEDURE — 3288F FALL RISK ASSESSMENT DOCD: CPT | Mod: CPTII,S$GLB,, | Performed by: OTOLARYNGOLOGY

## 2025-04-08 PROCEDURE — 1159F MED LIST DOCD IN RCRD: CPT | Mod: CPTII,S$GLB,, | Performed by: OTOLARYNGOLOGY

## 2025-04-08 PROCEDURE — 99214 OFFICE O/P EST MOD 30 MIN: CPT | Mod: S$GLB,,, | Performed by: OTOLARYNGOLOGY

## 2025-04-08 PROCEDURE — 99999 PR PBB SHADOW E&M-EST. PATIENT-LVL III: CPT | Mod: PBBFAC,,, | Performed by: OTOLARYNGOLOGY

## 2025-04-08 PROCEDURE — 1101F PT FALLS ASSESS-DOCD LE1/YR: CPT | Mod: CPTII,S$GLB,, | Performed by: OTOLARYNGOLOGY

## 2025-04-08 PROCEDURE — 3044F HG A1C LEVEL LT 7.0%: CPT | Mod: CPTII,S$GLB,, | Performed by: OTOLARYNGOLOGY

## 2025-04-08 NOTE — PROGRESS NOTES
"Subjective:       Patient ID: Betzy Ortega is a 74 y.o. male.    Chief Complaint: Ear Problem (Patient here for follow up on "fungus in the ears. I used two rounds of medicine and it was find until I stopped the medicine and now it's back. I also want my ears cleaned." )      This patient with a very long history of ear discharge does get some benefits when he is using Maxitrol type drops but it just keeps coming back and we had discuss some of the next steps we can pursue he is interested in pursuing them now.          Objective:      ENT Physical Exam    More on the right than the left that is just a little film of perhaps merlos crust I tried to wipe some off and it has a little stain a bled under it he mentioned seeing some bled recently he is on an aspirin a day but not another blood thinner it is not clear that he has not active infection although he could easily have a low-grade fungal infection but it does look like dried moisture the left ear has a bit less involved        Assessment:       1. Chronic otitis externa, unspecified laterality, unspecified type         Plan:          So I printed out this advice for him and it involves trying some powder to mixed in antifungal and a different antibacterial and a steroid and we are going to see if this gives him some more long term relief    ---------------------  I sent the powder into [facility] in [place] behind the hospital.  They will show you how to use the little puffer device that comes with it.  So put a good puff in your ears in the evening once a day for seven days let me know if you have any questions or problems the powder will kind of stay in there so it continues to act.  Let it sit for another seven days and then rinse out both ears with a solution of 1/2 to 1/2 white vinegar and water.  You can use the syringe I gave you.  Be aware that when you rinse your ear out if the solution is too cool it can make you dizzy so if you do it in the " shower or such be aware of that so you do not slip and fall.  And you can use a good bit just to rinse out any residual powder and also vinegar water is safe in your ear canal.  And then if things are going well you will have some powder left over, just see how it goes and if this has been of benefit you can repeat the process down the road.  Some people get a benefit from just vinegar water rinses done on some schedule like maybe once a week.

## 2025-04-09 ENCOUNTER — TELEPHONE (OUTPATIENT)
Dept: OTOLARYNGOLOGY | Facility: CLINIC | Age: 75
End: 2025-04-09
Payer: MEDICARE

## 2025-04-09 NOTE — TELEPHONE ENCOUNTER
"Patient call transferred to me from call center.     Sahnell Herron 9:40 AM  Francis Ferro, I have Pt Betzy MRN [id number] on the phone was seen yesterday by Dr Chu, pt has blood coming from her ear right now, can you assist?    Spoke with patient. He stated, " Last night I started seeping blood from my ear. He did scrape on it yesterday and I went to [place] and got the medicine. I keep having to stick a q tip in my ear to catch the seeping blood. I'm not sure if I should be worried or not. I can't come in today because I have a  scheduled to come to my house. Can you ask him if I need to be worried or be seen again."       "

## 2025-05-13 DIAGNOSIS — F51.01 PRIMARY INSOMNIA: ICD-10-CM

## 2025-05-13 RX ORDER — QUETIAPINE FUMARATE 100 MG/1
100 TABLET, FILM COATED ORAL 3 TIMES DAILY
Qty: 210 TABLET | Refills: 11 | Status: SHIPPED | OUTPATIENT
Start: 2025-05-13 | End: 2027-08-31

## 2025-05-13 NOTE — TELEPHONE ENCOUNTER
No care due was identified.  [facility] Embedded Care Due Messages. Reference number: [id number].   5/13/2025 1:04:30 PM CDT

## 2025-05-13 NOTE — TELEPHONE ENCOUNTER
----- Message from Amrita sent at 5/13/2025 12:28 PM CDT -----  Type:  RX Refill RequestWho Called: ptRX Name and Strength:QUEtiapine (SEROQUEL) 100 MG TabtraZODone (DESYREL) 100 MG tabletPreferred Pharmacy with phone number:[facility] [address] [place] [id number]: [phone] Fax: [phone] the patient rather a call back or a response via [facility]? callBest Call Back Number:Telephone Information:Mobile          [phone] Information:pharmacy stated that the provider discontinued QUEtiapine (SEROQUEL) 100 MG Tab medication pt needs new script to be filled please advise thank you

## 2025-06-02 ENCOUNTER — TELEPHONE (OUTPATIENT)
Dept: FAMILY MEDICINE | Facility: CLINIC | Age: 75
End: 2025-06-02
Payer: MEDICARE

## 2025-06-02 DIAGNOSIS — G47.00 INSOMNIA, UNSPECIFIED TYPE: ICD-10-CM

## 2025-06-02 RX ORDER — TRAZODONE HYDROCHLORIDE 100 MG/1
200 TABLET ORAL DAILY
Qty: 180 TABLET | Refills: 1 | Status: SHIPPED | OUTPATIENT
Start: 2025-06-02

## 2025-06-09 DIAGNOSIS — I10 ESSENTIAL HYPERTENSION: ICD-10-CM

## 2025-06-09 RX ORDER — AMLODIPINE BESYLATE 2.5 MG/1
TABLET ORAL
Qty: 90 TABLET | Refills: 2 | Status: SHIPPED | OUTPATIENT
Start: 2025-06-09

## 2025-06-09 NOTE — TELEPHONE ENCOUNTER
No care due was identified.  [facility] Embedded Care Due Messages. Reference number: [id number].   6/09/2025 1:13:04 PM CDT

## 2025-06-10 NOTE — TELEPHONE ENCOUNTER
Refill Decision Note   Betzy Ortega  is requesting a refill authorization.  Brief Assessment and Rationale for Refill:  Approve     Medication Therapy Plan:         Comments:     Note composed:7:48 PM 06/09/2025

## 2025-06-14 RX ORDER — PANTOPRAZOLE SODIUM 40 MG/1
40 TABLET, DELAYED RELEASE ORAL
Qty: 90 TABLET | Refills: 2 | Status: SHIPPED | OUTPATIENT
Start: 2025-06-14

## 2025-06-14 NOTE — TELEPHONE ENCOUNTER
No care due was identified.  [facility] Embedded Care Due Messages. Reference number: [id number].   6/14/2025 3:18:36 AM CDT

## 2025-06-14 NOTE — TELEPHONE ENCOUNTER
Refill Decision Note   Betzy Ortega  is requesting a refill authorization.  Brief Assessment and Rationale for Refill:  Approve     Medication Therapy Plan:        Comments:     Note composed:10:48 AM 06/14/2025

## 2025-07-14 ENCOUNTER — TELEPHONE (OUTPATIENT)
Dept: FAMILY MEDICINE | Facility: CLINIC | Age: 75
End: 2025-07-14
Payer: MEDICARE

## 2025-07-14 NOTE — TELEPHONE ENCOUNTER
Copied from CRM #[id number]. Topic: Medications - Medication Refill  >> Jul 14, 2025  9:43 AM Chiqui wrote:  Type:  RX Refill Request    Who Called: Pt   Refill or New Rx: Refill   RX Name and Strength: amLODIPine (NORVASC) 2.5 MG tablet  How is the patient currently taking it? (ex. 1XDay): as directed  Is this a 30 day or 90 day RX: 90  Preferred Pharmacy with phone number:   [id number] [place] [id number] [facility] [address]  [address] [place] [id number]  Phone: [phone] Fax: [phone]  Local or Mail Order: Local  Ordering Provider: Dr. Mckeon   Would the patient rather a call back or a response via [facility]?  Call   Best Call Back Number: [phone]  Additional Information:  Pt states he is out of medication.  Please call to advise... Thank you...

## 2025-08-27 RX ORDER — ROSUVASTATIN CALCIUM 40 MG/1
40 TABLET, COATED ORAL DAILY
Qty: 90 TABLET | Refills: 1 | Status: SHIPPED | OUTPATIENT
Start: 2025-08-27

## 2025-08-29 DIAGNOSIS — F51.01 PRIMARY INSOMNIA: ICD-10-CM

## 2025-08-29 DIAGNOSIS — F41.9 ANXIETY: ICD-10-CM

## 2025-08-31 RX ORDER — DOXEPIN HYDROCHLORIDE 10 MG/1
10 CAPSULE ORAL
Qty: 90 CAPSULE | Refills: 1 | Status: SHIPPED | OUTPATIENT
Start: 2025-08-31

## 2025-09-03 ENCOUNTER — TELEPHONE (OUTPATIENT)
Dept: FAMILY MEDICINE | Facility: CLINIC | Age: 75
End: 2025-09-03
Payer: MEDICARE